# Patient Record
Sex: MALE | Race: BLACK OR AFRICAN AMERICAN
[De-identification: names, ages, dates, MRNs, and addresses within clinical notes are randomized per-mention and may not be internally consistent; named-entity substitution may affect disease eponyms.]

---

## 2021-10-03 ENCOUNTER — HOSPITAL ENCOUNTER (EMERGENCY)
Dept: HOSPITAL 74 - JER | Age: 56
Discharge: LEFT BEFORE DISCHARGE | End: 2021-10-03
Payer: COMMERCIAL

## 2021-10-03 VITALS — SYSTOLIC BLOOD PRESSURE: 205 MMHG | HEART RATE: 62 BPM | DIASTOLIC BLOOD PRESSURE: 116 MMHG

## 2021-10-03 VITALS — BODY MASS INDEX: 26.4 KG/M2

## 2021-10-03 VITALS — TEMPERATURE: 97.7 F

## 2021-10-03 DIAGNOSIS — R07.9: Primary | ICD-10-CM

## 2021-10-03 LAB
ALBUMIN SERPL-MCNC: 2 G/DL (ref 3.4–5)
ALP SERPL-CCNC: 266 U/L (ref 45–117)
ALT SERPL-CCNC: 92 U/L (ref 13–61)
ANION GAP SERPL CALC-SCNC: 8 MMOL/L (ref 8–16)
ANISOCYTOSIS BLD QL: 0
APPEARANCE UR: CLEAR
APTT BLD: 28.7 SECONDS (ref 25.2–36.5)
AST SERPL-CCNC: 70 U/L (ref 15–37)
BACTERIA # UR AUTO: 11 /UL (ref 0–1359)
BASO STIPL BLD QL SMEAR: 0
BILIRUB SERPL-MCNC: 0.3 MG/DL (ref 0.2–1)
BILIRUB UR STRIP.AUTO-MCNC: NEGATIVE MG/DL
BNP SERPL-MCNC: 8180.5 PG/ML (ref 5–125)
BUN SERPL-MCNC: 71.7 MG/DL (ref 7–18)
CALCIUM SERPL-MCNC: 7.4 MG/DL (ref 8.5–10.1)
CASTS URNS QL MICRO: 1 /UL (ref 0–3.1)
CHLORIDE SERPL-SCNC: 111 MMOL/L (ref 98–107)
CO2 SERPL-SCNC: 23 MMOL/L (ref 21–32)
COLOR UR: YELLOW
CREAT SERPL-MCNC: 4.7 MG/DL (ref 0.55–1.3)
DACRYOCYTES BLD QL SMEAR: 0
DEPRECATED RDW RBC AUTO: 14.1 % (ref 11.9–15.9)
DOHLE BOD BLD QL SMEAR: 0
EPITH CASTS URNS QL MICRO: 8 /UL (ref 0–25.1)
GLUCOSE SERPL-MCNC: 182 MG/DL (ref 74–106)
HCT VFR BLD CALC: 36.6 % (ref 35.4–49)
HELMET CELLS BLD QL SMEAR: 0
HGB BLD-MCNC: 12.5 GM/DL (ref 11.7–16.9)
HOWELL-JOLLY BOD BLD QL SMEAR: 0
INR BLD: 0.9 (ref 0.83–1.09)
KETONES UR QL STRIP: NEGATIVE
LEUKOCYTE ESTERASE UR QL STRIP.AUTO: NEGATIVE
LIPASE SERPL-CCNC: 389 U/L (ref 73–393)
MACROCYTES BLD QL: 0
MAGNESIUM SERPL-MCNC: 2.2 MG/DL (ref 1.8–2.4)
MCH RBC QN AUTO: 29.8 PG (ref 25.7–33.7)
MCHC RBC AUTO-ENTMCNC: 34.2 G/DL (ref 32–35.9)
MCV RBC: 87.1 FL (ref 80–96)
NITRITE UR QL STRIP: NEGATIVE
OVALOCYTES BLD QL SMEAR: 0
PH UR: 5.5 [PH] (ref 5–8)
PLATELET # BLD AUTO: 123 10^3/UL (ref 134–434)
PLATELET BLD QL SMEAR: (no result)
PMV BLD: 10.3 FL (ref 7.5–11.1)
PROT SERPL-MCNC: 6.6 G/DL (ref 6.4–8.2)
PROT UR QL STRIP: (no result)
PROT UR QL STRIP: (no result)
PT PNL PPP: 11 SEC (ref 9.7–13)
RBC # BLD AUTO: 4.2 M/MM3 (ref 4–5.6)
RBC # BLD AUTO: 44 /UL (ref 0–23.9)
ROULEAUX BLD QL SMEAR: 0
SICKLE CELLS BLD QL SMEAR: 0
SODIUM SERPL-SCNC: 142 MMOL/L (ref 136–145)
SP GR UR: 1.02 (ref 1.01–1.03)
TARGETS BLD QL SMEAR: 0
TOXIC GRANULES BLD QL SMEAR: 0
UROBILINOGEN UR STRIP-MCNC: 1 MG/DL (ref 0.2–1)
WBC # BLD AUTO: 6.1 K/MM3 (ref 4–10)
WBC # UR AUTO: 11 /UL (ref 0–25.8)

## 2021-10-03 PROCEDURE — U0005 INFEC AGEN DETEC AMPLI PROBE: HCPCS

## 2021-10-03 PROCEDURE — C9803 HOPD COVID-19 SPEC COLLECT: HCPCS

## 2021-10-03 PROCEDURE — U0003 INFECTIOUS AGENT DETECTION BY NUCLEIC ACID (DNA OR RNA); SEVERE ACUTE RESPIRATORY SYNDROME CORONAVIRUS 2 (SARS-COV-2) (CORONAVIRUS DISEASE [COVID-19]), AMPLIFIED PROBE TECHNIQUE, MAKING USE OF HIGH THROUGHPUT TECHNOLOGIES AS DESCRIBED BY CMS-2020-01-R: HCPCS

## 2022-01-13 PROBLEM — Z00.00 ENCOUNTER FOR PREVENTIVE HEALTH EXAMINATION: Status: ACTIVE | Noted: 2022-01-13

## 2022-02-11 ENCOUNTER — APPOINTMENT (OUTPATIENT)
Dept: HEART AND VASCULAR | Facility: CLINIC | Age: 57
End: 2022-02-11
Payer: COMMERCIAL

## 2022-02-11 ENCOUNTER — NON-APPOINTMENT (OUTPATIENT)
Age: 57
End: 2022-02-11

## 2022-02-11 VITALS — SYSTOLIC BLOOD PRESSURE: 190 MMHG | DIASTOLIC BLOOD PRESSURE: 100 MMHG

## 2022-02-11 VITALS
HEIGHT: 71 IN | BODY MASS INDEX: 28 KG/M2 | OXYGEN SATURATION: 98 % | HEART RATE: 68 BPM | TEMPERATURE: 97.9 F | WEIGHT: 200 LBS

## 2022-02-11 VITALS — SYSTOLIC BLOOD PRESSURE: 176 MMHG | DIASTOLIC BLOOD PRESSURE: 107 MMHG

## 2022-02-11 DIAGNOSIS — Z83.3 FAMILY HISTORY OF DIABETES MELLITUS: ICD-10-CM

## 2022-02-11 DIAGNOSIS — Z87.891 PERSONAL HISTORY OF NICOTINE DEPENDENCE: ICD-10-CM

## 2022-02-11 DIAGNOSIS — N52.2 DRUG-INDUCED ERECTILE DYSFUNCTION: ICD-10-CM

## 2022-02-11 DIAGNOSIS — N18.9 CHRONIC KIDNEY DISEASE, UNSPECIFIED: ICD-10-CM

## 2022-02-11 DIAGNOSIS — Z82.49 FAMILY HISTORY OF ISCHEMIC HEART DISEASE AND OTHER DISEASES OF THE CIRCULATORY SYSTEM: ICD-10-CM

## 2022-02-11 DIAGNOSIS — R35.1 BENIGN PROSTATIC HYPERPLASIA WITH LOWER URINARY TRACT SYMPMS: ICD-10-CM

## 2022-02-11 DIAGNOSIS — N40.1 BENIGN PROSTATIC HYPERPLASIA WITH LOWER URINARY TRACT SYMPMS: ICD-10-CM

## 2022-02-11 PROCEDURE — 99204 OFFICE O/P NEW MOD 45 MIN: CPT

## 2022-02-11 PROCEDURE — 93000 ELECTROCARDIOGRAM COMPLETE: CPT

## 2022-02-11 NOTE — ASSESSMENT
[FreeTextEntry1] : HTN- will add Amlodipine 5 mg, pt reports getting bad edema while on 10 mg. Another option is to give an alpha blocker at bedtime simce he has BPH with nocturia x 3.\par \par CKD- will discuss with Dr Sanchez\par \par STTw abnormalities- will echo, stress testing deferred until BP is controlled\par \par ED- Pt told he will need a stress test prior to taking a Viagra type drug.

## 2022-02-11 NOTE — REASON FOR VISIT
[FreeTextEntry1] : 55 y/o AAM with HTN, CKD, here with markedly elevated BP. Pt had a colonoscopy several days ago and was told it was very high there as well.\par \par \par EKG: NSR with ST-Twave abnormalities. 2/11/22

## 2022-03-18 ENCOUNTER — APPOINTMENT (OUTPATIENT)
Dept: HEART AND VASCULAR | Facility: CLINIC | Age: 57
End: 2022-03-18
Payer: COMMERCIAL

## 2022-03-18 ENCOUNTER — APPOINTMENT (OUTPATIENT)
Dept: HEART AND VASCULAR | Facility: CLINIC | Age: 57
End: 2022-03-18

## 2022-03-18 VITALS
SYSTOLIC BLOOD PRESSURE: 191 MMHG | WEIGHT: 192 LBS | DIASTOLIC BLOOD PRESSURE: 124 MMHG | TEMPERATURE: 97.7 F | HEART RATE: 74 BPM | OXYGEN SATURATION: 99 % | BODY MASS INDEX: 26.88 KG/M2 | HEIGHT: 71 IN

## 2022-03-18 PROCEDURE — 93306 TTE W/DOPPLER COMPLETE: CPT

## 2022-03-18 PROCEDURE — 99213 OFFICE O/P EST LOW 20 MIN: CPT

## 2022-03-18 RX ORDER — AMLODIPINE BESYLATE 5 MG/1
5 TABLET ORAL
Qty: 90 | Refills: 3 | Status: DISCONTINUED | COMMUNITY
Start: 2022-02-11 | End: 2022-03-18

## 2022-03-18 RX ORDER — VALSARTAN 160 MG/1
160 TABLET, COATED ORAL DAILY
Refills: 0 | Status: DISCONTINUED | COMMUNITY
End: 2022-03-18

## 2022-03-18 NOTE — ASSESSMENT
[FreeTextEntry1] : HTN- will add Amlodipine 5 mg, pt reports getting bad edema while on 10 mg. Another option is to give an alpha blocker at bedtime simce he has BPH with nocturia x 3.  Pt got edema and so he stopped all pills!!  Warned not to do that.  Echo reveals LVH and a dilated Ao root of 43 mm.  Pt told to resume Coreg 25 BID and I am starting Losartan-HCT 50-12.5.  Pt must return in 30 days and he has been told to call if stopping any meds.  His wife is in attendance today.\par \par CKD- will discuss with Dr Sanchez\par \par STTw abnormalities- will echo, stress testing deferred until BP is controlled\par \par ED- Pt told he will need a stress test prior to taking a Viagra type drug.

## 2022-03-18 NOTE — REASON FOR VISIT
[FreeTextEntry1] : 58 y/o AAM with HTN, CKD, here with markedly elevated BP. Pt had a colonoscopy several days ago and was told it was very high there as well.\par \par 3/18/22 Here for f/u, he stopped ALL pills, blaming all of them for his edema.  BP way up.  Echo today.  Pt told he is not permitted to stop a pill without speaking to me or Dr Sanchez.\par \par \par EKG: NSR with ST-Twave abnormalities. 2/11/22

## 2022-03-22 ENCOUNTER — RX CHANGE (OUTPATIENT)
Age: 57
End: 2022-03-22

## 2022-03-24 ENCOUNTER — RX CHANGE (OUTPATIENT)
Age: 57
End: 2022-03-24

## 2022-03-24 RX ORDER — HYDROCHLOROTHIAZIDE 12.5 MG/1
12.5 TABLET ORAL
Qty: 90 | Refills: 3 | Status: ACTIVE | COMMUNITY
Start: 2022-03-24 | End: 1900-01-01

## 2022-03-24 RX ORDER — LOSARTAN POTASSIUM 50 MG/1
50 TABLET, FILM COATED ORAL
Qty: 90 | Refills: 3 | Status: ACTIVE | COMMUNITY
Start: 2022-03-24 | End: 1900-01-01

## 2022-04-22 ENCOUNTER — APPOINTMENT (OUTPATIENT)
Dept: HEART AND VASCULAR | Facility: CLINIC | Age: 57
End: 2022-04-22

## 2022-09-05 ENCOUNTER — INPATIENT (INPATIENT)
Facility: HOSPITAL | Age: 57
LOS: 4 days | Discharge: HOME | DRG: 673 | End: 2022-09-10
Attending: HOSPITALIST
Payer: COMMERCIAL

## 2022-09-05 VITALS
WEIGHT: 205.03 LBS | RESPIRATION RATE: 20 BRPM | OXYGEN SATURATION: 97 % | SYSTOLIC BLOOD PRESSURE: 171 MMHG | DIASTOLIC BLOOD PRESSURE: 106 MMHG | TEMPERATURE: 98 F | HEIGHT: 72 IN | HEART RATE: 80 BPM
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
## 2022-09-05 DIAGNOSIS — E11.9 TYPE 2 DIABETES MELLITUS WITHOUT COMPLICATIONS: ICD-10-CM

## 2022-09-05 DIAGNOSIS — D64.9 ANEMIA, UNSPECIFIED: ICD-10-CM

## 2022-09-05 DIAGNOSIS — B19.20 UNSPECIFIED VIRAL HEPATITIS C WITHOUT HEPATIC COMA: ICD-10-CM

## 2022-09-05 DIAGNOSIS — Z29.9 ENCOUNTER FOR PROPHYLACTIC MEASURES, UNSPECIFIED: ICD-10-CM

## 2022-09-05 DIAGNOSIS — N18.9 CHRONIC KIDNEY DISEASE, UNSPECIFIED: ICD-10-CM

## 2022-09-05 DIAGNOSIS — E87.2 ACIDOSIS: ICD-10-CM

## 2022-09-05 DIAGNOSIS — I16.0 HYPERTENSIVE URGENCY: ICD-10-CM

## 2022-09-05 DIAGNOSIS — R77.8 OTHER SPECIFIED ABNORMALITIES OF PLASMA PROTEINS: ICD-10-CM

## 2022-09-05 LAB
ALBUMIN SERPL ELPH-MCNC: 3.2 G/DL — LOW (ref 3.3–5)
ALP SERPL-CCNC: 345 U/L — HIGH (ref 40–120)
ALT FLD-CCNC: 44 U/L — SIGNIFICANT CHANGE UP (ref 10–45)
AMPHET UR-MCNC: NEGATIVE — SIGNIFICANT CHANGE UP
ANION GAP SERPL CALC-SCNC: 20 MMOL/L — HIGH (ref 5–17)
APPEARANCE UR: ABNORMAL
APPEARANCE UR: CLEAR — SIGNIFICANT CHANGE UP
AST SERPL-CCNC: 46 U/L — HIGH (ref 10–40)
BACTERIA # UR AUTO: PRESENT /HPF
BACTERIA # UR AUTO: PRESENT /HPF
BARBITURATES UR SCN-MCNC: NEGATIVE — SIGNIFICANT CHANGE UP
BASE EXCESS BLDV CALC-SCNC: -9.9 MMOL/L — LOW (ref -2–3)
BASOPHILS # BLD AUTO: 0.04 K/UL — SIGNIFICANT CHANGE UP (ref 0–0.2)
BASOPHILS NFR BLD AUTO: 0.6 % — SIGNIFICANT CHANGE UP (ref 0–2)
BENZODIAZ UR-MCNC: NEGATIVE — SIGNIFICANT CHANGE UP
BILIRUB SERPL-MCNC: 0.6 MG/DL — SIGNIFICANT CHANGE UP (ref 0.2–1.2)
BILIRUB UR-MCNC: NEGATIVE — SIGNIFICANT CHANGE UP
BILIRUB UR-MCNC: NEGATIVE — SIGNIFICANT CHANGE UP
BLD GP AB SCN SERPL QL: NEGATIVE — SIGNIFICANT CHANGE UP
BUN SERPL-MCNC: 97 MG/DL — HIGH (ref 7–23)
CA-I SERPL-SCNC: 0.9 MMOL/L — LOW (ref 1.15–1.33)
CALCIUM SERPL-MCNC: 6.8 MG/DL — LOW (ref 8.4–10.5)
CHLORIDE SERPL-SCNC: 107 MMOL/L — SIGNIFICANT CHANGE UP (ref 96–108)
CHLORIDE UR-SCNC: 62 MMOL/L — SIGNIFICANT CHANGE UP
CK MB CFR SERPL CALC: 4.3 NG/ML — SIGNIFICANT CHANGE UP (ref 0–6.7)
CK SERPL-CCNC: 449 U/L — HIGH (ref 30–200)
CO2 BLDV-SCNC: 17.9 MMOL/L — LOW (ref 22–26)
CO2 SERPL-SCNC: 17 MMOL/L — LOW (ref 22–31)
COCAINE METAB.OTHER UR-MCNC: NEGATIVE — SIGNIFICANT CHANGE UP
COLOR SPEC: YELLOW — SIGNIFICANT CHANGE UP
COLOR SPEC: YELLOW — SIGNIFICANT CHANGE UP
COMMENT - URINE: SIGNIFICANT CHANGE UP
CREAT ?TM UR-MCNC: 45 MG/DL — SIGNIFICANT CHANGE UP
CREAT ?TM UR-MCNC: 79 MG/DL — SIGNIFICANT CHANGE UP
CREAT SERPL-MCNC: 13.27 MG/DL — HIGH (ref 0.5–1.3)
DIFF PNL FLD: ABNORMAL
DIFF PNL FLD: ABNORMAL
EGFR: 4 ML/MIN/1.73M2 — LOW
EOSINOPHIL # BLD AUTO: 0.24 K/UL — SIGNIFICANT CHANGE UP (ref 0–0.5)
EOSINOPHIL NFR BLD AUTO: 3.5 % — SIGNIFICANT CHANGE UP (ref 0–6)
EPI CELLS # UR: SIGNIFICANT CHANGE UP /HPF (ref 0–5)
EPI CELLS # UR: SIGNIFICANT CHANGE UP /HPF (ref 0–5)
GAS PNL BLDV: 132 MMOL/L — LOW (ref 136–145)
GAS PNL BLDV: SIGNIFICANT CHANGE UP
GLUCOSE BLDC GLUCOMTR-MCNC: 168 MG/DL — HIGH (ref 70–99)
GLUCOSE SERPL-MCNC: 103 MG/DL — HIGH (ref 70–99)
GLUCOSE UR QL: NEGATIVE — SIGNIFICANT CHANGE UP
GLUCOSE UR QL: NEGATIVE — SIGNIFICANT CHANGE UP
GRAN CASTS # UR COMP ASSIST: ABNORMAL /LPF
HCO3 BLDV-SCNC: 17 MMOL/L — LOW (ref 22–29)
HCT VFR BLD CALC: 29.4 % — LOW (ref 39–50)
HGB BLD-MCNC: 9.8 G/DL — LOW (ref 13–17)
IMM GRANULOCYTES NFR BLD AUTO: 1.3 % — SIGNIFICANT CHANGE UP (ref 0–1.5)
KETONES UR-MCNC: NEGATIVE — SIGNIFICANT CHANGE UP
KETONES UR-MCNC: NEGATIVE — SIGNIFICANT CHANGE UP
LEUKOCYTE ESTERASE UR-ACNC: NEGATIVE — SIGNIFICANT CHANGE UP
LEUKOCYTE ESTERASE UR-ACNC: NEGATIVE — SIGNIFICANT CHANGE UP
LIDOCAIN IGE QN: 66 U/L — HIGH (ref 7–60)
LYMPHOCYTES # BLD AUTO: 0.96 K/UL — LOW (ref 1–3.3)
LYMPHOCYTES # BLD AUTO: 13.8 % — SIGNIFICANT CHANGE UP (ref 13–44)
MAGNESIUM SERPL-MCNC: 2.4 MG/DL — SIGNIFICANT CHANGE UP (ref 1.6–2.6)
MCHC RBC-ENTMCNC: 28.2 PG — SIGNIFICANT CHANGE UP (ref 27–34)
MCHC RBC-ENTMCNC: 33.3 GM/DL — SIGNIFICANT CHANGE UP (ref 32–36)
MCV RBC AUTO: 84.5 FL — SIGNIFICANT CHANGE UP (ref 80–100)
METHADONE UR-MCNC: NEGATIVE — SIGNIFICANT CHANGE UP
MONOCYTES # BLD AUTO: 0.77 K/UL — SIGNIFICANT CHANGE UP (ref 0–0.9)
MONOCYTES NFR BLD AUTO: 11.1 % — SIGNIFICANT CHANGE UP (ref 2–14)
NEUTROPHILS # BLD AUTO: 4.85 K/UL — SIGNIFICANT CHANGE UP (ref 1.8–7.4)
NEUTROPHILS NFR BLD AUTO: 69.7 % — SIGNIFICANT CHANGE UP (ref 43–77)
NITRITE UR-MCNC: NEGATIVE — SIGNIFICANT CHANGE UP
NITRITE UR-MCNC: NEGATIVE — SIGNIFICANT CHANGE UP
NRBC # BLD: 0 /100 WBCS — SIGNIFICANT CHANGE UP (ref 0–0)
NT-PROBNP SERPL-SCNC: HIGH PG/ML (ref 0–300)
OPIATES UR-MCNC: NEGATIVE — SIGNIFICANT CHANGE UP
OSMOLALITY UR: 335 MOSM/KG — SIGNIFICANT CHANGE UP (ref 300–900)
OSMOLALITY UR: 352 MOSM/KG — SIGNIFICANT CHANGE UP (ref 300–900)
PCO2 BLDV: 38 MMHG — LOW (ref 42–55)
PCP SPEC-MCNC: SIGNIFICANT CHANGE UP
PCP UR-MCNC: NEGATIVE — SIGNIFICANT CHANGE UP
PH BLDV: 7.25 — LOW (ref 7.32–7.43)
PH UR: 6 — SIGNIFICANT CHANGE UP (ref 5–8)
PH UR: 6 — SIGNIFICANT CHANGE UP (ref 5–8)
PHOSPHATE SERPL-MCNC: 7.6 MG/DL — HIGH (ref 2.5–4.5)
PLATELET # BLD AUTO: 127 K/UL — LOW (ref 150–400)
PO2 BLDV: <33 MMHG — SIGNIFICANT CHANGE UP (ref 25–45)
POTASSIUM BLDV-SCNC: 5.2 MMOL/L — HIGH (ref 3.5–5.1)
POTASSIUM SERPL-MCNC: 4.5 MMOL/L — SIGNIFICANT CHANGE UP (ref 3.5–5.3)
POTASSIUM SERPL-SCNC: 4.5 MMOL/L — SIGNIFICANT CHANGE UP (ref 3.5–5.3)
POTASSIUM UR-SCNC: 24 MMOL/L — SIGNIFICANT CHANGE UP
PROT SERPL-MCNC: 7.1 G/DL — SIGNIFICANT CHANGE UP (ref 6–8.3)
PROT UR-MCNC: 100 MG/DL
PROT UR-MCNC: 100 MG/DL
RBC # BLD: 3.48 M/UL — LOW (ref 4.2–5.8)
RBC # FLD: 13 % — SIGNIFICANT CHANGE UP (ref 10.3–14.5)
RBC CASTS # UR COMP ASSIST: < 5 /HPF — SIGNIFICANT CHANGE UP
RBC CASTS # UR COMP ASSIST: ABNORMAL /HPF
RH IG SCN BLD-IMP: POSITIVE — SIGNIFICANT CHANGE UP
SAO2 % BLDV: 50.4 % — LOW (ref 67–88)
SARS-COV-2 RNA SPEC QL NAA+PROBE: NEGATIVE — SIGNIFICANT CHANGE UP
SODIUM SERPL-SCNC: 144 MMOL/L — SIGNIFICANT CHANGE UP (ref 135–145)
SODIUM UR-SCNC: 111 MMOL/L — SIGNIFICANT CHANGE UP
SODIUM UR-SCNC: 83 MMOL/L — SIGNIFICANT CHANGE UP
SP GR SPEC: 1.02 — SIGNIFICANT CHANGE UP (ref 1–1.03)
SP GR SPEC: 1.02 — SIGNIFICANT CHANGE UP (ref 1–1.03)
THC UR QL: NEGATIVE — SIGNIFICANT CHANGE UP
TROPONIN T SERPL-MCNC: 0.29 NG/ML — CRITICAL HIGH (ref 0–0.01)
UROBILINOGEN FLD QL: 0.2 E.U./DL — SIGNIFICANT CHANGE UP
UROBILINOGEN FLD QL: 0.2 E.U./DL — SIGNIFICANT CHANGE UP
UUN UR-MCNC: 217 MG/DL — SIGNIFICANT CHANGE UP
WBC # BLD: 6.95 K/UL — SIGNIFICANT CHANGE UP (ref 3.8–10.5)
WBC # FLD AUTO: 6.95 K/UL — SIGNIFICANT CHANGE UP (ref 3.8–10.5)
WBC UR QL: < 5 /HPF — SIGNIFICANT CHANGE UP
WBC UR QL: < 5 /HPF — SIGNIFICANT CHANGE UP

## 2022-09-05 PROCEDURE — 71046 X-RAY EXAM CHEST 2 VIEWS: CPT | Mod: 26,59

## 2022-09-05 PROCEDURE — 99291 CRITICAL CARE FIRST HOUR: CPT | Mod: 25

## 2022-09-05 PROCEDURE — 71046 X-RAY EXAM CHEST 2 VIEWS: CPT | Mod: 26

## 2022-09-05 PROCEDURE — 93010 ELECTROCARDIOGRAM REPORT: CPT

## 2022-09-05 RX ORDER — FUROSEMIDE 40 MG
40 TABLET ORAL ONCE
Refills: 0 | Status: COMPLETED | OUTPATIENT
Start: 2022-09-05 | End: 2022-09-05

## 2022-09-05 RX ORDER — INFLUENZA VIRUS VACCINE 15; 15; 15; 15 UG/.5ML; UG/.5ML; UG/.5ML; UG/.5ML
0.5 SUSPENSION INTRAMUSCULAR ONCE
Refills: 0 | Status: DISCONTINUED | OUTPATIENT
Start: 2022-09-05 | End: 2022-09-10

## 2022-09-05 RX ORDER — CARVEDILOL PHOSPHATE 80 MG/1
12.5 CAPSULE, EXTENDED RELEASE ORAL EVERY 12 HOURS
Refills: 0 | Status: DISCONTINUED | OUTPATIENT
Start: 2022-09-05 | End: 2022-09-05

## 2022-09-05 RX ORDER — CALCITRIOL 0.5 UG/1
0.25 CAPSULE ORAL DAILY
Refills: 0 | Status: DISCONTINUED | OUTPATIENT
Start: 2022-09-05 | End: 2022-09-07

## 2022-09-05 RX ORDER — GLUCAGON INJECTION, SOLUTION 0.5 MG/.1ML
1 INJECTION, SOLUTION SUBCUTANEOUS ONCE
Refills: 0 | Status: DISCONTINUED | OUTPATIENT
Start: 2022-09-05 | End: 2022-09-10

## 2022-09-05 RX ORDER — HYDROCHLOROTHIAZIDE 25 MG
12.5 TABLET ORAL DAILY
Refills: 0 | Status: DISCONTINUED | OUTPATIENT
Start: 2022-09-06 | End: 2022-09-06

## 2022-09-05 RX ORDER — ONDANSETRON 8 MG/1
4 TABLET, FILM COATED ORAL EVERY 8 HOURS
Refills: 0 | Status: DISCONTINUED | OUTPATIENT
Start: 2022-09-05 | End: 2022-09-10

## 2022-09-05 RX ORDER — ACETAMINOPHEN 500 MG
1000 TABLET ORAL ONCE
Refills: 0 | Status: COMPLETED | OUTPATIENT
Start: 2022-09-05 | End: 2022-09-05

## 2022-09-05 RX ORDER — DEXTROSE 50 % IN WATER 50 %
12.5 SYRINGE (ML) INTRAVENOUS ONCE
Refills: 0 | Status: DISCONTINUED | OUTPATIENT
Start: 2022-09-05 | End: 2022-09-10

## 2022-09-05 RX ORDER — DEXTROSE 50 % IN WATER 50 %
25 SYRINGE (ML) INTRAVENOUS ONCE
Refills: 0 | Status: DISCONTINUED | OUTPATIENT
Start: 2022-09-05 | End: 2022-09-10

## 2022-09-05 RX ORDER — DEXTROSE 50 % IN WATER 50 %
15 SYRINGE (ML) INTRAVENOUS ONCE
Refills: 0 | Status: DISCONTINUED | OUTPATIENT
Start: 2022-09-05 | End: 2022-09-10

## 2022-09-05 RX ORDER — DIATRIZOATE MEGLUMINE 180 MG/ML
30 INJECTION, SOLUTION INTRAVESICAL ONCE
Refills: 0 | Status: COMPLETED | OUTPATIENT
Start: 2022-09-05 | End: 2022-09-05

## 2022-09-05 RX ORDER — HEPARIN SODIUM 5000 [USP'U]/ML
5000 INJECTION INTRAVENOUS; SUBCUTANEOUS EVERY 8 HOURS
Refills: 0 | Status: DISCONTINUED | OUTPATIENT
Start: 2022-09-05 | End: 2022-09-06

## 2022-09-05 RX ORDER — LOSARTAN POTASSIUM 100 MG/1
100 TABLET, FILM COATED ORAL DAILY
Refills: 0 | Status: DISCONTINUED | OUTPATIENT
Start: 2022-09-05 | End: 2022-09-05

## 2022-09-05 RX ORDER — SODIUM CHLORIDE 9 MG/ML
1000 INJECTION, SOLUTION INTRAVENOUS
Refills: 0 | Status: DISCONTINUED | OUTPATIENT
Start: 2022-09-05 | End: 2022-09-10

## 2022-09-05 RX ORDER — INSULIN LISPRO 100/ML
VIAL (ML) SUBCUTANEOUS
Refills: 0 | Status: DISCONTINUED | OUTPATIENT
Start: 2022-09-05 | End: 2022-09-10

## 2022-09-05 RX ORDER — LOSARTAN POTASSIUM 100 MG/1
1 TABLET, FILM COATED ORAL
Qty: 0 | Refills: 0 | DISCHARGE

## 2022-09-05 RX ORDER — AMLODIPINE BESYLATE 2.5 MG/1
5 TABLET ORAL ONCE
Refills: 0 | Status: COMPLETED | OUTPATIENT
Start: 2022-09-05 | End: 2022-09-05

## 2022-09-05 RX ORDER — ACETAMINOPHEN 500 MG
650 TABLET ORAL EVERY 6 HOURS
Refills: 0 | Status: DISCONTINUED | OUTPATIENT
Start: 2022-09-05 | End: 2022-09-10

## 2022-09-05 RX ORDER — CALCITRIOL 0.5 UG/1
1 CAPSULE ORAL
Qty: 0 | Refills: 0 | DISCHARGE

## 2022-09-05 RX ORDER — LANOLIN ALCOHOL/MO/W.PET/CERES
3 CREAM (GRAM) TOPICAL AT BEDTIME
Refills: 0 | Status: DISCONTINUED | OUTPATIENT
Start: 2022-09-05 | End: 2022-09-10

## 2022-09-05 RX ADMIN — CARVEDILOL PHOSPHATE 12.5 MILLIGRAM(S): 80 CAPSULE, EXTENDED RELEASE ORAL at 17:41

## 2022-09-05 RX ADMIN — Medication 40 MILLIGRAM(S): at 18:40

## 2022-09-05 RX ADMIN — HEPARIN SODIUM 5000 UNIT(S): 5000 INJECTION INTRAVENOUS; SUBCUTANEOUS at 21:52

## 2022-09-05 RX ADMIN — Medication 2: at 21:51

## 2022-09-05 RX ADMIN — LOSARTAN POTASSIUM 100 MILLIGRAM(S): 100 TABLET, FILM COATED ORAL at 17:40

## 2022-09-05 RX ADMIN — AMLODIPINE BESYLATE 5 MILLIGRAM(S): 2.5 TABLET ORAL at 17:40

## 2022-09-05 RX ADMIN — DIATRIZOATE MEGLUMINE 30 MILLILITER(S): 180 INJECTION, SOLUTION INTRAVESICAL at 16:52

## 2022-09-05 RX ADMIN — Medication 1000 MILLIGRAM(S): at 20:40

## 2022-09-05 RX ADMIN — Medication 400 MILLIGRAM(S): at 20:01

## 2022-09-05 NOTE — ED ADULT NURSE NOTE - OBJECTIVE STATEMENT
Pt presents reporting shortness of breath, lower leg edema, round and taught abdomen, itching skin worsening for weeks. Hx of ckd, htn. Pt also reports decreased urine output.

## 2022-09-05 NOTE — H&P ADULT - NSHPPHYSICALEXAM_GEN_ALL_CORE
PHYSICAL EXAM:  General: NAD, AAOx3  HEENT: atraumatic, normocephalic  Pulmonary: clear to auscultation bilaterally; No wheeze  Cardiovascular: Regular rate and rhythm; no murmurs, rubs or gallops. Normal S1S2  Gastrointestinal: Soft, nontender, nondistended; bowel sounds present  Musculoskeletal: 2+ peripheral pulses, no clubbing, cyanosis or edema  Neurology: Pt. alert and oriented, fluent speech, able to move all extremities  Skin: no rashes or lesions PHYSICAL EXAM:  General: NAD, AAOx3  HEENT: atraumatic, normocephalic  Pulmonary: clear to auscultation bilaterally; No wheeze  Cardiovascular: Regular rate and rhythm; no murmurs, rubs or gallops. Normal S1S2  Gastrointestinal: Soft, nontender, +distended; bowel sounds present  Musculoskeletal: 2+ peripheral pulses, no clubbing or cyanosis +Edema b/l  Neurology: Pt. alert and oriented, fluent speech, able to move all extremities  Skin: no rashes or lesions

## 2022-09-05 NOTE — H&P ADULT - PROBLEM SELECTOR PLAN 3
Patient reports fall on right side 2 weeks ago. No ecchymosis on exam.  -PT/OT Pt. with anion gap of 20 on presentation. Likely secondary to elevated BUN in the setting of HUNG on CKD. VBG pH 7.25 which does not indicate need for urgent dialysis.     - will continue to monitor HUNG as above.

## 2022-09-05 NOTE — ED ADULT TRIAGE NOTE - CHIEF COMPLAINT QUOTE
pt presents to ED for c/o sob x 1 week , diffuse abd pain/distention, and bilat lower extremity swelling. PMH HTN, CKD. pt also states he fell several days ago onto R side, c/o R sided rib pain. denies n/v/d, fever, chills, cough, or chest pain

## 2022-09-05 NOTE — ED PROVIDER NOTE - RESPIRATORY, MLM
Breath sounds clear and equal bilaterally. speaking in long full sentences, no resp distress or increased resp effort

## 2022-09-05 NOTE — ED PROVIDER NOTE - ATTENDING CONTRIBUTION TO CARE
The pt is a 58 y/o M, very poor historian, c/o sob x few days to few wks. PMH HTN (non compliant w/meds --losartan, coreg, norvasc), and states being told "kidney function compromised" - has not seen pmd in a yr. States sob, leg swelling (chronic) and abd bloating x wk. Reports a fall 2 wks ago and R side feels sore. Denies fevers, chills, cp, cough, n/v/d, abd pain, dysuria.    pt c/o sob x while, non compliant w/bp meds, states that has been told about kidney problems but never on HD, very poor historian, hypertensive upon arrival - given home meds, ekg non ischemic, initially ordered for ct but upon resolution of labs - acute renal failure w/elevated bnp and trop (likely due to renal function), ct cancelled and lasix ordered, lima placed and lytes sent, micu and renal consulted -- will admit to tele    Agree with above note as documented by MELQUIADES FLOREZ was available as the supervising attending during patient's ER evaluation.    Pt with acute renal failure, swelling, overload - K ok - no acute distress but required immediate consultation with nephrology, MICU - lima placed and will most likely have to have catheter placement for emergent dialysis.

## 2022-09-05 NOTE — H&P ADULT - PROBLEM SELECTOR PLAN 6
Trop 0.29 on admission. Patient denies chest pain and no ischemic changes on EKG.   - trend troponin to peak Hgb 9.8 on admission (unknown baseline Hgb). No melena, hematochezia, or signs of active bleeding. Possibly 2/2 CKD.    -f/u iron studies  -trend CBC  -maintain active T&S  -transfuse if Hgb <7 - pt. with reported history of DM, but not on any medication    - f/u A1C  - start ISS

## 2022-09-05 NOTE — H&P ADULT - ASSESSMENT
56yo M w/ a PMHx of HTN, DM, CKD presented w/ sob, abdominal distension, and LE edema x1wk. Found to have HUNG on CKD and HTN. Admitted for further management of care.

## 2022-09-05 NOTE — H&P ADULT - PROBLEM SELECTOR PLAN 8
Dvt ppx: heparin subQ  PPI ppx: None indicated  Diet: Renal restrictions  Dispo: 7-lach Trop 0.29 on admission. Patient denies chest pain and no ischemic changes on EKG.     - trend troponin to peak

## 2022-09-05 NOTE — PATIENT PROFILE ADULT - FALL HARM RISK - HARM RISK INTERVENTIONS

## 2022-09-05 NOTE — ED PROVIDER NOTE - CLINICAL SUMMARY MEDICAL DECISION MAKING FREE TEXT BOX
pt c/o sob x while, non compliant w/bp meds, states that has been told about kidney problems but never on HD, very poor historian, hypertensive upon arrival - given home meds, ekg non ischemic, initially ordered for ct but upon resolution of labs - acute renal failure w/elevated bnp and trop (likely due to renal function), ct cancelled and lasix ordered, lima placed and lytes sent, micu and renal consulted -- will admit to tele

## 2022-09-05 NOTE — H&P ADULT - NSHPLABSRESULTS_GEN_ALL_CORE
LABS:                         9.8    6.95  )-----------( 127      ( 05 Sep 2022 16:50 )             29.4         144  |  107  |  97<H>  ----------------------------<  103<H>  4.5   |  17<L>  |  13.27<H>    Ca    6.8<L>      05 Sep 2022 16:50    TPro  7.1  /  Alb  3.2<L>  /  TBili  0.6  /  DBili  x   /  AST  46<H>  /  ALT  44  /  AlkPhos  345<H>        Urinalysis Basic - ( 05 Sep 2022 18:40 )    Color: Yellow / Appearance: Clear / S.020 / pH: x  Gluc: x / Ketone: NEGATIVE  / Bili: Negative / Urobili: 0.2 E.U./dL   Blood: x / Protein: 100 mg/dL / Nitrite: NEGATIVE   Leuk Esterase: NEGATIVE / RBC: 5-10 /HPF / WBC < 5 /HPF   Sq Epi: x / Non Sq Epi: 0-5 /HPF / Bacteria: Present /HPF      CARDIAC MARKERS ( 05 Sep 2022 16:50 )  x     / 0.29 ng/mL / x     / x     / x          Serum Pro-Brain Natriuretic Peptide: 73581 pg/mL ( @ 16:50)        RADIOLOGY, EKG & ADDITIONAL TESTS:

## 2022-09-05 NOTE — H&P ADULT - PROBLEM SELECTOR PLAN 4
Patient reports fall on right side 2 weeks ago. No ecchymosis on exam.  -PT/OT Patient reports fall on right side 2 weeks ago. No ecchymosis on exam.    -PT/OT in AM

## 2022-09-05 NOTE — H&P ADULT - PROBLEM SELECTOR PLAN 2
Cr 13.27 (unknown baseline Cr). Follows outpatient w/ nephrologist Dr. Almanza.  - Nephrology following, f/u recs  - q12h BMP  - U.alamz  - Renal US  - avoid nephrotoxic agents  - adjust medication dosages for level of renal function  - strict I/Os and daily weights Cr 13.27 (unknown baseline Cr). Follows outpatient w/ nephrologist Dr. Almanza.    - Nephrology following, f/u recs  - q12h BMP  - U.almaz  - Renal US  - avoid nephrotoxic agents  - adjust medication dosages for level of renal function  - strict I/Os and daily weights

## 2022-09-05 NOTE — H&P ADULT - HISTORY OF PRESENT ILLNESS
56yo M with HTN, DM, CKD presented w/ sob, abdominal distension, and LE edema x1wk. Patient reports he is not always compliant with home medications. Patient also states he fell on to his right side about 2 weeks ago. Patient is not on HD and follows outpatient w/ Dr. Almanza (nephrology). Denies fevers, headaches, change in vision, chest pain, n/v/d.    ED Course:   VITAL SIGNS: Last 24 Hours  T(F): 98.5 (05 Sep 2022 15:49), Max: 98.5 (05 Sep 2022 15:49)  HR: 72 (05 Sep 2022 19:45) (72 - 80)  BP: 130/87 (05 Sep 2022 19:45) (130/87 - 177/116)  RR: 18 (05 Sep 2022 19:45) (18 - 20)  SpO2: 97% (05 Sep 2022 19:45) (94% - 97%)    Labs:  WBC 6.95, Hgb 9.8, Plt 127  Na 144, K 4.5, BUN 97, Cr 13.27, Alk Phos 345, Trop 0.29, BNP 30,151  EKG: NSR, PVC, no ST deviations    Imaging:  CXR: No consolidations or signs of PNX    Interventions:   Amlodipine 5mg, Coreg 12.5, lasix 40mg, losartan 100mg   56 yo M with HTN, DM, CKD presented w/ sob, abdominal distension, and LE edema x1wk. Patient reports he is not always compliant with home medications. Patient also states he fell on to his right side about 2 weeks ago. Patient is not on HD and follows outpatient w/ Dr. Almanza (nephrology). Denies fevers, headaches, change in vision, chest pain, n/v/d.    ED Course:   VITAL SIGNS: Last 24 Hours  T(F): 98.5 (05 Sep 2022 15:49), Max: 98.5 (05 Sep 2022 15:49)  HR: 72 (05 Sep 2022 19:45) (72 - 80)  BP: 130/87 (05 Sep 2022 19:45) (130/87 - 177/116)  RR: 18 (05 Sep 2022 19:45) (18 - 20)  SpO2: 97% (05 Sep 2022 19:45) (94% - 97%)    Labs:  WBC 6.95, Hgb 9.8, Plt 127  Na 144, K 4.5, BUN 97, Cr 13.27, Alk Phos 345, Trop 0.29, BNP 30,151  EKG: NSR, PVC, no ST deviations    Imaging:  CXR: No consolidations or signs of PNX    Interventions:   Amlodipine 5mg, Coreg 12.5, lasix 40mg, losartan 100mg   58 yo M with HTN, DM, CKD, HCV presented w/ sob, abdominal distension, and LE edema. Over the past 2 weeks he has noticed his abdomen expanding and increased LE edema. Over the past week he has become more dyspneic with exertion. Four days prior to admission, patient also fell on his left side as a result of the increased edema. He follows outpatient nephrology with Dr. Almanza as outpatient. He has discussed dialysis with his nephrologist, but was told he did not require dialysis on his last vitis. Patient also reports history of HCV. He reportedly was treated in the past. He follows with GI for HCV, and per patient was still positive for HCV on his last visit 2-3 months prior. Patient reports he is not always compliant with home medications. Denies fevers, headaches, change in vision, chest pain, n/v/d.    ED Course:   VITAL SIGNS: Last 24 Hours  T(F): 98.5 (05 Sep 2022 15:49), Max: 98.5 (05 Sep 2022 15:49)  HR: 72 (05 Sep 2022 19:45) (72 - 80)  BP: 130/87 (05 Sep 2022 19:45) (130/87 - 177/116)  RR: 18 (05 Sep 2022 19:45) (18 - 20)  SpO2: 97% (05 Sep 2022 19:45) (94% - 97%)    Labs:  WBC 6.95, Hgb 9.8, Plt 127  Na 144, K 4.5, BUN 97, Cr 13.27, Alk Phos 345, Trop 0.29, BNP 30,151  EKG: NSR, PVC, no ST deviations    Imaging:  CXR: No consolidations or signs of PNX    Interventions:   Amlodipine 5mg, Coreg 12.5, lasix 40mg, losartan 100mg

## 2022-09-05 NOTE — H&P ADULT - PROBLEM SELECTOR PLAN 9
Dvt ppx: heparin subQ  PPI ppx: None indicated  Diet: Carb consistent with Renal restrictions  Dispo: 7-lach

## 2022-09-05 NOTE — CHART NOTE - NSCHARTNOTEFT_GEN_A_CORE
HPI:    Received call from ED about patient. Patient is a 57 year old with a history of HTN, HLD, DM, CKD, non compliant with medications who presents to the ED with complaint of sob x 1 week , diffuse abd pain/distention, and bilat lower extremity swelling. Nephrology consulted for ARF.     As per chart review patient with elevated BP of 171/106   Labs significant for BUN of 97 and Cr. of 13.27, Bicarb of 17    UA positive for proteins   Albumin 3.2     #ARF   Recommend:  STAT MALIK for strict in and outs   Monitor Urine out put   LASIX 80mg x1 IVP and monitor for urine output   Urine Na+ Urine Cr. Urine Urea, UPCR   Urine toxicology   AVOID RAASi meds for BP control   Renal Sono   BID CMP     I HPI:    Received call from ED about patient. Patient is a 57 year old with a history of HTN, HLD, DM, CKD, non compliant with medications who presents to the ED with complaint of sob x 1 week , diffuse abd pain/distention, and bilat lower extremity swelling. Nephrology consulted for ARF.     As per chart review patient with elevated BP of 171/106   Labs significant for BUN of 97 and Cr. of 13.27, Bicarb of 17    UA positive for proteins   Albumin 3.2     #ARF   Recommend:  STAT MALIK for strict in and outs   Monitor Urine out put   LASIX 80mg x1 IVP and monitor for urine output   Urine Na+ Urine Cr. Urine Urea, UPCR   Urine toxicology   AVOID RAASi meds for BP control   Renal Sono   BID CMP     If patient HPI:    Received call from ED about patient. Patient is a 57 year old with a history of HTN, HLD, DM, CKD, non compliant with medications who presents to the ED with complaint of sob x 1 week , diffuse abd pain/distention, and bilat lower extremity swelling. Nephrology consulted for ARF.     As per chart review patient with elevated BP of 171/106   Labs significant for BUN of 97 and Cr. of 13.27, Bicarb of 17    UA positive for proteins   Albumin 3.2     #ARF   Recommend:  STAT MALIK for strict in and outs   Monitor Urine out put   LASIX 80mg x1 IVP and monitor for urine output   Urine Na+ Urine Cr. Urine Urea, UPCR   Urine toxicology   AVOID RAASi meds for BP control   Renal Sono   BID CMP   ICU Consult     In case, patient continues to have worsening electrolytes and becomes fluid overloaded, will need RRT     Navjot Bonilla M.D.   PGY 4 Nephrology Fellow   [phone]

## 2022-09-05 NOTE — H&P ADULT - PROBLEM SELECTOR PLAN 1
/106 on admission. No AMS or chest pain but patient has elevated Cr (likely 2/2 CKD rather than HTN) and elevated troponin. On home HCTZ 12.5 qd and losartan 50mg qd. Received amlodipine 5mg, coreg 12.5, losartan 100mg, and lasix 40mg IV in ED. Likely 2/2 medication noncompliance vs volume overload i/s/o HUNG on CKD.  - monitor BP after getting multiple agents in ED  - c/w home HCTZ 12.5 (may need to uptitrate)  - hold home losartan 50mg qd i/s/o HUNG /106 on admission. No AMS or chest pain but patient has elevated Cr (likely 2/2 CKD rather than HTN) and elevated troponin. On home HCTZ 12.5 qd and losartan 50mg qd. Received amlodipine 5mg, coreg 12.5, losartan 100mg, and lasix 40mg IV in ED. Likely 2/2 medication noncompliance vs volume overload i/s/o HUNG on CKD.    - monitor BP after getting multiple agents in ED  - c/w home HCTZ 12.5 (may need to uptitrate)  - hold home losartan 50mg qd i/s/o HUNG

## 2022-09-05 NOTE — CONSULT NOTE ADULT - ASSESSMENT
56yo M w/ a PMHx of HTN, DM, CKD presented w/ sob, abdominal distension, and LE edema x1wk. Found to have HUNG on CKD and HTN. Admitted for further management of care.     #HTN Urgency  /106 on admission. No AMS or chest pain but patient has elevated Cr (likely 2/2 CKD rather than HTN) and elevated troponin. On home HCTZ 12.5 qd and losartan 50mg qd. Received amlodipine 5mg, coreg 12.5, losartan 100mg, and lasix 40mg IV in ED. Likely 2/2 medication noncompliance vs volume overload i/s/o HUNG on CKD.  - monitor BP after getting multiple agents in ED  - c/w home HCTZ 12.5 (may need to uptitrate)  - hold home losartan 50mg qd i/s/o HUNG    #HUNG on CKD  Cr 13.27 (unknown baseline Cr). Follows outpatient w/ nephrologist Dr. Almanza.  - Nephrology following, f/u recs  - q12h MEGAN  - Isabella  - Renal US  - avoid nephrotoxic agents  - adjust medication dosages for level of renal function  - strict I/Os and daily weights    #Fall  Patient reports fall on right side 2 weeks ago. No ecchymosis on exam.  -PT/OT    #DM  -f/u A1c  -ISS    #Anemia  Hgb 9.8 on admission (unknown baseline Hgb). No melena, hematochezia, or signs of active bleeding. Possibly 2/2 CKD  -f/u iron studies  -trend CBC  -maintain active T&S  -transfuse if Hgb <7    #Elevated Troponin  Trop 0.29 on admission. Patient denies chest pain and no ischemic changes on EKG.   - trend troponin to peak    Dispo: 7L    All recommendations are considered final after attending attestation.

## 2022-09-05 NOTE — H&P ADULT - PROBLEM SELECTOR PLAN 5
Hgb 9.8 on admission (unknown baseline Hgb). No melena, hematochezia, or signs of active bleeding. Possibly 2/2 CKD  -f/u iron studies  -trend CBC  -maintain active T&S  -transfuse if Hgb <7 Patient reports fall on right side 2 weeks ago. No ecchymosis on exam.    -PT/OT Pt. with history of Hep C. He was reportedly treated as outpatient, but was still positive 2-3 months prior to admission. If pt. has cirrhotic findings on US, may be contributing to abdominal distention.     - f/u HCV labs  - f/u liver US

## 2022-09-05 NOTE — H&P ADULT - PROBLEM SELECTOR PLAN 7
Dvt ppx:  PPI ppx:   Diet  Dispo:   Activity: Trop 0.29 on admission. Patient denies chest pain and no ischemic changes on EKG.     - trend troponin to peak Hgb 9.8 on admission (unknown baseline Hgb). No melena, hematochezia, or signs of active bleeding. Possibly 2/2 CKD.    -f/u iron studies  -trend CBC  -maintain active T&S  -transfuse if Hgb <7

## 2022-09-05 NOTE — CONSULT NOTE ADULT - ATTENDING COMMENTS
56yo M w/ a PMHx of HTN, DM, CKD presented w/ sob, abdominal distension, and LE edema x1wk. Patient reports he is not always compliant with home medications. Patient also states he fell on to his right side about 2 weeks ago. Patient is not on HD and follows outpatient w/ Dr. Almanza (nephrology). Denies fevers, headaches, change in vision, chest pain, n/v/d.    has worsening ARAUJO for the past two weeks or so. worsening whole body swelling predominantly in the BL legs and the abdomen. needs tighter BP control. no indication for emergent HD. not clinically uremic. follow with nephro recs. - Isabella  - Renal US  - avoid nephrotoxic agents  - adjust medication dosages for level of renal function  - strict I/Os and daily weights  fluid restriction.    Fall precautions    serial ekgs.    DVT ppx

## 2022-09-05 NOTE — ED ADULT NURSE NOTE - CHIEF COMPLAINT QUOTE
pt presents to ED for c/o sob x 1 week , diffuse abd pain/distention, and bilat lower extremity swelling. PMH HTN, CKD. pt also states he fell several days ago onto R side, c/o R sided rib pain. denies n/v/d, fever, chills, cough, or chest pain no

## 2022-09-05 NOTE — ED PROVIDER NOTE - MUSCULOSKELETAL, MLM
Spine appears normal, range of motion is not limited, no muscle or joint tenderness; LE 1+ edema b/l

## 2022-09-05 NOTE — ED PROVIDER NOTE - OBJECTIVE STATEMENT
The pt is a 58 y/o M, very poor historian, c/o sob x few days to few wks. PMH HTN (non compliant w/meds --losartan, coreg, norvasc), and states being told "kidney function compromised" - has not seen pmd in a yr. States sob, leg swelling (chronic) and abd bloating x wk. Reports a fall 2 wks ago and R side feels sore. Denies fevers, chills, cp, cough, n/v/d, abd pain, dysuria.

## 2022-09-05 NOTE — PATIENT PROFILE ADULT - FALL HARM RISK - PATIENT NEEDS ASSISTANCE
----- Message from Rosie Agee CNM sent at 3/4/2022 11:18 AM CST -----  Please notify of negative wet mount. I'll send the vaginitis panel for confirmation and this will result early next week. In the meantime, please send a prescription to her pharmacy of choice for lotrisone cream - she can use this BID for symptom relief. Thanks!   Walking

## 2022-09-06 DIAGNOSIS — N18.6 END STAGE RENAL DISEASE: ICD-10-CM

## 2022-09-06 DIAGNOSIS — K74.60 UNSPECIFIED CIRRHOSIS OF LIVER: ICD-10-CM

## 2022-09-06 LAB
A1C WITH ESTIMATED AVERAGE GLUCOSE RESULT: 5.2 % — SIGNIFICANT CHANGE UP (ref 4–5.6)
ALBUMIN SERPL ELPH-MCNC: 2.6 G/DL — LOW (ref 3.3–5)
ALP SERPL-CCNC: 293 U/L — HIGH (ref 40–120)
ALT FLD-CCNC: 38 U/L — SIGNIFICANT CHANGE UP (ref 10–45)
ANION GAP SERPL CALC-SCNC: 18 MMOL/L — HIGH (ref 5–17)
AST SERPL-CCNC: 40 U/L — SIGNIFICANT CHANGE UP (ref 10–40)
BILIRUB SERPL-MCNC: 0.4 MG/DL — SIGNIFICANT CHANGE UP (ref 0.2–1.2)
BUN SERPL-MCNC: 90 MG/DL — HIGH (ref 7–23)
CALCIUM SERPL-MCNC: 6.3 MG/DL — CRITICAL LOW (ref 8.4–10.5)
CHLORIDE SERPL-SCNC: 107 MMOL/L — SIGNIFICANT CHANGE UP (ref 96–108)
CO2 SERPL-SCNC: 15 MMOL/L — LOW (ref 22–31)
CREAT SERPL-MCNC: 13.51 MG/DL — HIGH (ref 0.5–1.3)
EGFR: 4 ML/MIN/1.73M2 — LOW
ESTIMATED AVERAGE GLUCOSE: 103 MG/DL — SIGNIFICANT CHANGE UP (ref 68–114)
FERRITIN SERPL-MCNC: 994 NG/ML — HIGH (ref 30–400)
GGT SERPL-CCNC: 743 U/L — HIGH (ref 9–50)
GLUCOSE BLDC GLUCOMTR-MCNC: 114 MG/DL — HIGH (ref 70–99)
GLUCOSE BLDC GLUCOMTR-MCNC: 138 MG/DL — HIGH (ref 70–99)
GLUCOSE BLDC GLUCOMTR-MCNC: 148 MG/DL — HIGH (ref 70–99)
GLUCOSE BLDC GLUCOMTR-MCNC: 149 MG/DL — HIGH (ref 70–99)
GLUCOSE SERPL-MCNC: 123 MG/DL — HIGH (ref 70–99)
HAV IGM SER-ACNC: SIGNIFICANT CHANGE UP
HBV CORE AB SER-ACNC: REACTIVE
HBV SURFACE AB SER-ACNC: SIGNIFICANT CHANGE UP
HBV SURFACE AG SER-ACNC: SIGNIFICANT CHANGE UP
HCT VFR BLD CALC: 27.9 % — LOW (ref 39–50)
HCV AB S/CO SERPL IA: 41.04 S/CO — HIGH
HCV AB SERPL-IMP: REACTIVE
HCV RNA FLD QL NAA+PROBE: SIGNIFICANT CHANGE UP
HCV RNA SPEC QL PROBE+SIG AMP: DETECTED
HGB BLD-MCNC: 9.2 G/DL — LOW (ref 13–17)
IRON SATN MFR SERPL: 49 % — SIGNIFICANT CHANGE UP (ref 16–55)
IRON SATN MFR SERPL: 81 UG/DL — SIGNIFICANT CHANGE UP (ref 45–165)
MAGNESIUM SERPL-MCNC: 2.5 MG/DL — SIGNIFICANT CHANGE UP (ref 1.6–2.6)
MCHC RBC-ENTMCNC: 28.1 PG — SIGNIFICANT CHANGE UP (ref 27–34)
MCHC RBC-ENTMCNC: 33 GM/DL — SIGNIFICANT CHANGE UP (ref 32–36)
MCV RBC AUTO: 85.3 FL — SIGNIFICANT CHANGE UP (ref 80–100)
NRBC # BLD: 0 /100 WBCS — SIGNIFICANT CHANGE UP (ref 0–0)
PHOSPHATE SERPL-MCNC: 7.9 MG/DL — HIGH (ref 2.5–4.5)
PLATELET # BLD AUTO: 105 K/UL — LOW (ref 150–400)
POTASSIUM SERPL-MCNC: 4.8 MMOL/L — SIGNIFICANT CHANGE UP (ref 3.5–5.3)
POTASSIUM SERPL-SCNC: 4.8 MMOL/L — SIGNIFICANT CHANGE UP (ref 3.5–5.3)
PROT SERPL-MCNC: 5.9 G/DL — LOW (ref 6–8.3)
RBC # BLD: 3.27 M/UL — LOW (ref 4.2–5.8)
RBC # FLD: 12.8 % — SIGNIFICANT CHANGE UP (ref 10.3–14.5)
SODIUM SERPL-SCNC: 140 MMOL/L — SIGNIFICANT CHANGE UP (ref 135–145)
TIBC SERPL-MCNC: 165 UG/DL — LOW (ref 220–430)
TRANSFERRIN SERPL-MCNC: 145 MG/DL — LOW (ref 200–360)
TROPONIN T SERPL-MCNC: 0.24 NG/ML — CRITICAL HIGH (ref 0–0.01)
UIBC SERPL-MCNC: 84 UG/DL — LOW (ref 110–370)
WBC # BLD: 5.43 K/UL — SIGNIFICANT CHANGE UP (ref 3.8–10.5)
WBC # FLD AUTO: 5.43 K/UL — SIGNIFICANT CHANGE UP (ref 3.8–10.5)

## 2022-09-06 PROCEDURE — 99233 SBSQ HOSP IP/OBS HIGH 50: CPT | Mod: GC

## 2022-09-06 PROCEDURE — 76705 ECHO EXAM OF ABDOMEN: CPT | Mod: 26

## 2022-09-06 PROCEDURE — 99223 1ST HOSP IP/OBS HIGH 75: CPT | Mod: GC

## 2022-09-06 PROCEDURE — 74019 RADEX ABDOMEN 2 VIEWS: CPT | Mod: 26

## 2022-09-06 PROCEDURE — 99223 1ST HOSP IP/OBS HIGH 75: CPT

## 2022-09-06 PROCEDURE — 99232 SBSQ HOSP IP/OBS MODERATE 35: CPT

## 2022-09-06 PROCEDURE — 74150 CT ABDOMEN W/O CONTRAST: CPT | Mod: 26

## 2022-09-06 PROCEDURE — 99447 NTRPROF PH1/NTRNET/EHR 11-20: CPT

## 2022-09-06 RX ORDER — FUROSEMIDE 40 MG
40 TABLET ORAL EVERY 12 HOURS
Refills: 0 | Status: DISCONTINUED | OUTPATIENT
Start: 2022-09-06 | End: 2022-09-07

## 2022-09-06 RX ORDER — SODIUM BICARBONATE 1 MEQ/ML
650 SYRINGE (ML) INTRAVENOUS EVERY 12 HOURS
Refills: 0 | Status: DISCONTINUED | OUTPATIENT
Start: 2022-09-06 | End: 2022-09-07

## 2022-09-06 RX ORDER — SENNA PLUS 8.6 MG/1
2 TABLET ORAL AT BEDTIME
Refills: 0 | Status: DISCONTINUED | OUTPATIENT
Start: 2022-09-06 | End: 2022-09-10

## 2022-09-06 RX ORDER — AMLODIPINE BESYLATE 2.5 MG/1
10 TABLET ORAL EVERY 24 HOURS
Refills: 0 | Status: DISCONTINUED | OUTPATIENT
Start: 2022-09-07 | End: 2022-09-07

## 2022-09-06 RX ORDER — POLYETHYLENE GLYCOL 3350 17 G/17G
17 POWDER, FOR SOLUTION ORAL DAILY
Refills: 0 | Status: DISCONTINUED | OUTPATIENT
Start: 2022-09-06 | End: 2022-09-10

## 2022-09-06 RX ORDER — CALCIUM GLUCONATE 100 MG/ML
2 VIAL (ML) INTRAVENOUS ONCE
Refills: 0 | Status: COMPLETED | OUTPATIENT
Start: 2022-09-06 | End: 2022-09-06

## 2022-09-06 RX ORDER — CALCIUM ACETATE 667 MG
667 TABLET ORAL
Refills: 0 | Status: DISCONTINUED | OUTPATIENT
Start: 2022-09-06 | End: 2022-09-07

## 2022-09-06 RX ORDER — HEPARIN SODIUM 5000 [USP'U]/ML
5000 INJECTION INTRAVENOUS; SUBCUTANEOUS ONCE
Refills: 0 | Status: COMPLETED | OUTPATIENT
Start: 2022-09-06 | End: 2022-09-06

## 2022-09-06 RX ADMIN — Medication 650 MILLIGRAM(S): at 23:29

## 2022-09-06 RX ADMIN — HEPARIN SODIUM 5000 UNIT(S): 5000 INJECTION INTRAVENOUS; SUBCUTANEOUS at 14:58

## 2022-09-06 RX ADMIN — HEPARIN SODIUM 5000 UNIT(S): 5000 INJECTION INTRAVENOUS; SUBCUTANEOUS at 22:32

## 2022-09-06 RX ADMIN — Medication 40 MILLIGRAM(S): at 12:32

## 2022-09-06 RX ADMIN — HEPARIN SODIUM 5000 UNIT(S): 5000 INJECTION INTRAVENOUS; SUBCUTANEOUS at 05:18

## 2022-09-06 RX ADMIN — Medication 100 GRAM(S): at 22:32

## 2022-09-06 RX ADMIN — Medication 40 MILLIGRAM(S): at 23:29

## 2022-09-06 RX ADMIN — CALCITRIOL 0.25 MICROGRAM(S): 0.5 CAPSULE ORAL at 11:10

## 2022-09-06 RX ADMIN — Medication 12.5 MILLIGRAM(S): at 05:18

## 2022-09-06 RX ADMIN — SENNA PLUS 2 TABLET(S): 8.6 TABLET ORAL at 22:32

## 2022-09-06 RX ADMIN — Medication 650 MILLIGRAM(S): at 12:32

## 2022-09-06 NOTE — OCCUPATIONAL THERAPY INITIAL EVALUATION ADULT - ADDITIONAL COMMENTS
Pt lives with his wife in an apartment with elevator access ~2-3 steps to enter. Prior to admit, pt independent with all ADLs/IADLs and mobility, did not require any DME or ADs.

## 2022-09-06 NOTE — PHYSICAL THERAPY INITIAL EVALUATION ADULT - PERTINENT HX OF CURRENT PROBLEM, REHAB EVAL
57 year old male presented w/ sob, abdominal distension, and LE edema x1wk. Found to have HUNG on CKD and HTN. Admitted for further management of care.

## 2022-09-06 NOTE — PROGRESS NOTE ADULT - PROBLEM SELECTOR PLAN 1
Cr 13.27 (baseline Cr 10). Follows outpatient w/ nephrologist Dr. Almanza.    - Nephrology following, f/u recs  - q12h BMP  - U.lytes  - Renal US  - avoid nephrotoxic agents  - adjust medication dosages for level of renal function  - strict I/Os and daily weights  -ID planning for PermCath placement tomorrow, plan for dialysis after permcath placement    #fluid overload, abdomen distended, LE edema  -c/w lasix IV 40 BID

## 2022-09-06 NOTE — OCCUPATIONAL THERAPY INITIAL EVALUATION ADULT - DIAGNOSIS, OT EVAL
Pt admitted for HUNG presents at functional baseline, able to perform all bed mobility, transfers, and ADLs independently. No further acute OT needs. Pt will be d/c from OT at this time.

## 2022-09-06 NOTE — PROGRESS NOTE ADULT - SUBJECTIVE AND OBJECTIVE BOX
OVERNIGHT EVENTS:    SUBJECTIVE / INTERVAL HPI: Patient seen and examined at bedside.     VITAL SIGNS:  Vital Signs Last 24 Hrs  T(C): 36.4 (06 Sep 2022 06:11), Max: 36.9 (05 Sep 2022 15:49)  T(F): 97.5 (06 Sep 2022 06:11), Max: 98.5 (05 Sep 2022 15:49)  HR: 68 (06 Sep 2022 04:39) (68 - 80)  BP: 153/84 (06 Sep 2022 04:39) (111/75 - 177/116)  BP(mean): 115 (06 Sep 2022 04:39) (86 - 115)  RR: 17 (06 Sep 2022 04:39) (16 - 20)  SpO2: 99% (06 Sep 2022 04:39) (94% - 99%)    Parameters below as of 06 Sep 2022 04:39  Patient On (Oxygen Delivery Method): room air      I&O's Summary    05 Sep 2022 07:01  -  06 Sep 2022 07:00  --------------------------------------------------------  IN: 100 mL / OUT: 580 mL / NET: -480 mL    06 Sep 2022 07:01  -  06 Sep 2022 09:17  --------------------------------------------------------  IN: 100 mL / OUT: 150 mL / NET: -50 mL        PHYSICAL EXAM:    General: NAD  HEENT: NC/AT; PERRL, anicteric sclera; MMM  Neck: supple  Cardiovascular: +S1/S2; RRR  Respiratory: CTA B/L; no W/R/R  Gastrointestinal: soft, NT/ND; +BSx4  Extremities: WWP; no edema, clubbing or cyanosis  Vascular: 2+ radial, DP/PT pulses B/L  Neurological: AAOx3; no focal deficits    MEDICATIONS:  MEDICATIONS  (STANDING):  calcitriol   Capsule 0.25 MICROGram(s) Oral daily  dextrose 5%. 1000 milliLiter(s) (100 mL/Hr) IV Continuous <Continuous>  dextrose 5%. 1000 milliLiter(s) (50 mL/Hr) IV Continuous <Continuous>  dextrose 50% Injectable 25 Gram(s) IV Push once  dextrose 50% Injectable 12.5 Gram(s) IV Push once  dextrose 50% Injectable 25 Gram(s) IV Push once  glucagon  Injectable 1 milliGRAM(s) IntraMuscular once  heparin   Injectable 5000 Unit(s) SubCutaneous every 8 hours  influenza   Vaccine 0.5 milliLiter(s) IntraMuscular once  insulin lispro (ADMELOG) corrective regimen sliding scale   SubCutaneous Before meals and at bedtime    MEDICATIONS  (PRN):  acetaminophen     Tablet .. 650 milliGRAM(s) Oral every 6 hours PRN Temp greater or equal to 38C (100.4F), Mild Pain (1 - 3)  aluminum hydroxide/magnesium hydroxide/simethicone Suspension 30 milliLiter(s) Oral every 4 hours PRN Dyspepsia  dextrose Oral Gel 15 Gram(s) Oral once PRN Blood Glucose LESS THAN 70 milliGRAM(s)/deciliter  melatonin 3 milliGRAM(s) Oral at bedtime PRN Insomnia  ondansetron Injectable 4 milliGRAM(s) IV Push every 8 hours PRN Nausea and/or Vomiting      ALLERGIES:  Allergies    penicillin (Rash)    Intolerances        LABS:                        9.2    5.43  )-----------( 105      ( 06 Sep 2022 05:30 )             27.9     09-06    140  |  107  |  90<H>  ----------------------------<  123<H>  4.8   |  15<L>  |  13.51<H>    Ca    6.3<LL>      06 Sep 2022 05:30  Phos  7.9       Mg     2.5     -    TPro  5.9<L>  /  Alb  2.6<L>  /  TBili  0.4  /  DBili  x   /  AST  40  /  ALT  38  /  AlkPhos  293<H>        Urinalysis Basic - ( 05 Sep 2022 18:40 )    Color: Yellow / Appearance: Clear / S.020 / pH: x  Gluc: x / Ketone: NEGATIVE  / Bili: Negative / Urobili: 0.2 E.U./dL   Blood: x / Protein: 100 mg/dL / Nitrite: NEGATIVE   Leuk Esterase: NEGATIVE / RBC: 5-10 /HPF / WBC < 5 /HPF   Sq Epi: x / Non Sq Epi: 0-5 /HPF / Bacteria: Present /HPF      CAPILLARY BLOOD GLUCOSE      POCT Blood Glucose.: 138 mg/dL (06 Sep 2022 06:49)      RADIOLOGY & ADDITIONAL TESTS: Reviewed.   Hospital Course:  56yo M w/ a PMHx of HTN, DM, CKD presented w/ sob, abdominal distension, and LE edema x1wk, found to have HTN urgency. /106 on admission. No AMS or chest pain but patient has elevated Cr (likely 2/2 CKD rather than HTN) , metabolic acidosis, elevated troponin. Patient is on HCTZ 12.5 qd and losartan 50mg qd at home, but has history of medication noncompliance. Received amlodipine 5mg, coreg 12.5, losartan 100mg, and lasix 40mg IV in ED. Patient was admitted to [facility] for further monitoring. Nephrology was consulted, plan for IR to place PermCath tomorrow, and then dialysis afterwards. Patient was started on NaHCO3 for metabolic acidosis while he awaits for dialysis. Patient has HepC, distended tympanic abdomen, GI was consulted this morning. Troponins peaked. Switched HCTZ to Amlodipine 10mg tomorrow given HUNG for blood pressure control. Patient was continued on lasix 40 IV BID. Patient currently hemodynamically stable for transfer to [facility].      OVERNIGHT EVENTS: No acute events overnight.    SUBJECTIVE / INTERVAL HPI: Patient seen and examined at bedside. Patient states she feels the same as yesterday. He states he is SOB when he walks, and has difficulty walking up the stairs and around the block. He does endorse a right sides abdominal pain the morning, that gets worse when he pushes against that side. He also states that he feels like he does not urinate as much as he use to the last few weeks despite being on the water pill. Patient denies any n/v, headaches, chest palpitations, constipation, diarrhea. Remaining ROS are negative.     VITAL SIGNS:  Vital Signs Last 24 Hrs  T(C): 36.4 (06 Sep 2022 06:11), Max: 36.9 (05 Sep 2022 15:49)  T(F): 97.5 (06 Sep 2022 06:11), Max: 98.5 (05 Sep 2022 15:49)  HR: 68 (06 Sep 2022 04:39) (68 - 80)  BP: 153/84 (06 Sep 2022 04:39) (111/75 - 177/116)  BP(mean): 115 (06 Sep 2022 04:39) (86 - 115)  RR: 17 (06 Sep 2022 04:39) (16 - 20)  SpO2: 99% (06 Sep 2022 04:39) (94% - 99%)    Parameters below as of 06 Sep 2022 04:39  Patient On (Oxygen Delivery Method): room air      I&O's Summary    05 Sep 2022 07:01  -  06 Sep 2022 07:00  --------------------------------------------------------  IN: 100 mL / OUT: 580 mL / NET: -480 mL    06 Sep 2022 07:01  -  06 Sep 2022 09:17  --------------------------------------------------------  IN: 100 mL / OUT: 150 mL / NET: -50 mL        PHYSICAL EXAM:  General: NAD, AAOx3  HEENT: atraumatic, normocephalic  Pulmonary: clear to auscultation bilaterally; No wheeze  Cardiovascular: Regular rate and rhythm; no murmurs, rubs or gallops. Normal S1S2  Gastrointestinal: firm, nontender, +distended; bowel sounds present, tympanitic abdomen  Musculoskeletal: 2+ peripheral pulses, no clubbing or cyanosis +Edema b/l LE  Neurology: Pt. alert and oriented, fluent speech, able to move all extremities  Skin: no rashes or lesions    MEDICATIONS:  MEDICATIONS  (STANDING):  calcitriol   Capsule 0.25 MICROGram(s) Oral daily  dextrose 5%. 1000 milliLiter(s) (100 mL/Hr) IV Continuous <Continuous>  dextrose 5%. 1000 milliLiter(s) (50 mL/Hr) IV Continuous <Continuous>  dextrose 50% Injectable 25 Gram(s) IV Push once  dextrose 50% Injectable 12.5 Gram(s) IV Push once  dextrose 50% Injectable 25 Gram(s) IV Push once  glucagon  Injectable 1 milliGRAM(s) IntraMuscular once  heparin   Injectable 5000 Unit(s) SubCutaneous every 8 hours  influenza   Vaccine 0.5 milliLiter(s) IntraMuscular once  insulin lispro (ADMELOG) corrective regimen sliding scale   SubCutaneous Before meals and at bedtime    MEDICATIONS  (PRN):  acetaminophen     Tablet .. 650 milliGRAM(s) Oral every 6 hours PRN Temp greater or equal to 38C (100.4F), Mild Pain (1 - 3)  aluminum hydroxide/magnesium hydroxide/simethicone Suspension 30 milliLiter(s) Oral every 4 hours PRN Dyspepsia  dextrose Oral Gel 15 Gram(s) Oral once PRN Blood Glucose LESS THAN 70 milliGRAM(s)/deciliter  melatonin 3 milliGRAM(s) Oral at bedtime PRN Insomnia  ondansetron Injectable 4 milliGRAM(s) IV Push every 8 hours PRN Nausea and/or Vomiting      ALLERGIES:  Allergies    penicillin (Rash)    Intolerances        LABS:                        9.2    5.43  )-----------( 105      ( 06 Sep 2022 05:30 )             27.9         140  |  107  |  90<H>  ----------------------------<  123<H>  4.8   |  15<L>  |  13.51<H>    Ca    6.3<LL>      06 Sep 2022 05:30  Phos  7.9       Mg     2.5         TPro  5.9<L>  /  Alb  2.6<L>  /  TBili  0.4  /  DBili  x   /  AST  40  /  ALT  38  /  AlkPhos  293<H>        Urinalysis Basic - ( 05 Sep 2022 18:40 )    Color: Yellow / Appearance: Clear / S.020 / pH: x  Gluc: x / Ketone: NEGATIVE  / Bili: Negative / Urobili: 0.2 E.U./dL   Blood: x / Protein: 100 mg/dL / Nitrite: NEGATIVE   Leuk Esterase: NEGATIVE / RBC: 5-10 /HPF / WBC < 5 /HPF   Sq Epi: x / Non Sq Epi: 0-5 /HPF / Bacteria: Present /HPF      CAPILLARY BLOOD GLUCOSE      POCT Blood Glucose.: 138 mg/dL (06 Sep 2022 06:49)      RADIOLOGY & ADDITIONAL TESTS: Reviewed.

## 2022-09-06 NOTE — OCCUPATIONAL THERAPY INITIAL EVALUATION ADULT - FINE MOTOR COORDINATION, RIGHT HAND THUMB/FINGER OPPOSITION SKILLS, OT EVAL
"Subjective:      No results for input(s): WBC, RBC, HEMOGLOBIN, HEMATOCRIT, MCV, MCH, RDW, PLATELETCT, MPV, NEUTSPOLYS, LYMPHOCYTES, MONOCYTES, EOSINOPHILS, BASOPHILS, RBCMORPHOLO in the last 72 hours. female POD#1 following status post vaginal hysterectomy, anterior colporrhaphy, posterior colporrhaphy, colposuspension and cystoscopy. for pelvic relaxation. Eating a regular diet without difficulty. Bowel movements are Normal.  Pain is controlled with current analgesics.  Medication(s) being used: prescription NSAID's including norco.       Objective:      /60 mmHg  Ht 1.626 m (5' 4.02\")  Wt 74.39 kg (164 lb)  BMI 28.14 kg/m2  Breastfeeding? Yes  General:  no acute distress, alert, cooperative   Abdomen: soft, bowel sounds active, non-tender   Incision:   healing well, no drainage, no erythema, no hernia, no seroma, no swelling, well approximated, no dehiscence, incision well approximated       Assessment:      Doing well postoperatively.  Operative findings again reviewed. Pathology report discussed.      Plan:      1. Continue any current medications.  2. Wound care discussed.  3. Pt is to increase activities as tolerated.  4. Follow up: 2 weeks for check.  5. Anticipated return to work 4 weeks.   " normal performance

## 2022-09-06 NOTE — CONSULT NOTE ADULT - ASSESSMENT
per Internal Medicine    57 y o M w/ a PMHx of HTN, DM, CKD presented w/ sob, abdominal distension, and LE edema x1wk. Found to have HUNG on CKD and HTN. Admitted for further management of care.     Problem/Plan - 1:  ·  Problem: Acute kidney injury superimposed on CKD.   ·  Plan: Cr 13.27 (baseline Cr 10). Follows outpatient w/ nephrologist Dr. Almanza.    - Nephrology following, f/u recs  - q12h BMP  - U.lytes  - Renal US  - avoid nephrotoxic agents  - adjust medication dosages for level of renal function  - strict I/Os and daily weights  -ID planning for PermCath placement tomorrow, plan for dialysis after permcath placement    #fluid overload, abdomen distended, LE edema  -c/w lasix IV 40 BID.    Problem/Plan - 2:  ·  Problem: Hypertensive urgency.   ·  Plan: /106 on admission. No AMS or chest pain but patient has elevated Cr (likely 2/2 CKD rather than HTN) and elevated troponin. On home HCTZ 12.5 qd and losartan 50mg qd. Received amlodipine 5mg, coreg 12.5, losartan 100mg, and lasix 40mg IV in ED. Likely 2/2 medication noncompliance vs volume overload i/s/o HUNG on CKD.    - monitor BP after getting multiple agents in ED  - c/w Amlodipine 10mg daily   - hold home losartan 50mg qd i/s/o HUNG  -f/u ECHO.    Problem/Plan - 3:  ·  Problem: High anion gap metabolic acidosis.   ·  Plan: Pt. with anion gap of 20 on presentation. Likely secondary to elevated BUN in the setting of HUNG on CKD. VBG pH 7.25 which does not indicate need for urgent dialysis.     - will continue to monitor HUNG as above.  -plan for dialysis tomorrow  -Start NaHCO3- 650 mg BID for met acidosis until patient starts dialysis.    Problem/Plan - 4:  ·  Problem: Fall.   ·  Plan: Patient reports fall on right side 2 weeks ago. No ecchymosis on exam.    -PT/OT in AM.    Problem/Plan - 5:  ·  Problem: Hepatitis C.   ·  Plan: Pt. with history of Hep C. He was reportedly treated as outpatient, but was still positive 2-3 months prior to admission. If pt. has cirrhotic findings on US, may be contributing to abdominal distention.     - f/u HCV labs  - f/u liver US  -f/u GI recs  - f/u CT abdomen/pelvis    #distended abdomen, tympanitic on physical exam  -f/u abdominal x-ray.    Problem/Plan - 6:  ·  Problem: Diabetes mellitus.   ·  Plan: - pt. with reported history of DM, but not on any medication    - f/u A1C  - start ISS.    Problem/Plan - 7:  ·  Problem: Anemia.   ·  Plan: Hgb 9.8 on admission (unknown baseline Hgb). No melena, hematochezia, or signs of active bleeding. Possibly 2/2 CKD.    -f/u iron studies  -trend CBC  -maintain active T&S  -transfuse if Hgb <7.    Problem/Plan - 8:  ·  Problem: Elevated troponin.   ·  Plan: Trop 0.29 on admission. Patient denies chest pain and no ischemic changes on EKG.   -troponin peaked, repeat troponin 0.24  - resolved.    Problem/Plan - 9:  ·  Problem: Prophylactic measure.   ·  Plan: Dvt ppx: heparin subQ  PPI ppx: None indicated  Diet: Carb consistent with Renal restrictions  Dispo: 7-lach.

## 2022-09-06 NOTE — PROGRESS NOTE ADULT - PROBLEM SELECTOR PLAN 3
Pt. with anion gap of 20 on presentation. Likely secondary to elevated BUN in the setting of HUNG on CKD. VBG pH 7.25 which does not indicate need for urgent dialysis.     - will continue to monitor HUNG as above.  -plan for dialysis tomorrow  -Start NaHCO3- 650 mg BID for met acidosis until patient starts dialysis. /106 on admission. No AMS or chest pain but patient has elevated Cr (likely 2/2 CKD rather than HTN) and elevated troponin. On home HCTZ 12.5 qd and losartan 50mg qd. Received amlodipine 5mg, coreg 12.5, losartan 100mg, and lasix 40mg IV in ED. Likely 2/2 medication noncompliance vs volume overload i/s/o HUNG on CKD.    - monitor BP after getting multiple agents in ED  - c/w Amlodipine 10mg daily   - hold home losartan 50mg qd i/s/o HUNG  -f/u ECHO

## 2022-09-06 NOTE — CONSULT NOTE ADULT - ASSESSMENT
56 yo M with HTN, DM, CKD, HCV presented w/ sob, abdominal distension, and LE edema. GI consulted for abd distension.     #Abd distension  - some degree of distension in setting of anasarca due to renal failure; however, also had ileus in setting of illness as evidenced by imaging and corroborated with tympanitic gaseous distension  - encourage ambulation, hydration, and can rx miralax, senna to aid in BM's; he is passing flatus, but has not had a BM   - f/u HCV RNA    Vivienne Hernandez MD  PGY-6, Gastroenterology Fellow  pager: [phone]

## 2022-09-06 NOTE — PROGRESS NOTE ADULT - PROBLEM SELECTOR PLAN 8
Trop 0.29 on admission. Patient denies chest pain and no ischemic changes on EKG. Likely 2/2 HUNG on CKD  -troponin peaked, repeat troponin 0.24  - resolved Hgb 9.8 on admission (unknown baseline Hgb). No melena, hematochezia, or signs of active bleeding. Possibly 2/2 CKD.    -iron panel c/w AOCD  -trend CBC  -maintain active T&S  -transfuse if Hgb <7

## 2022-09-06 NOTE — PROGRESS NOTE ADULT - PROBLEM SELECTOR PLAN 1
Cr 13.27 (baseline Cr 10). Follows outpatient w/ nephrologist Dr. Almanza.    - Nephrology following, f/u recs  - q12h BMP  - U.almaz  - Renal US  - f/u pth, vit d. Hb not at goal, f/u iron panel  - phoslo bid w/ meals  - avoid nephrotoxic agents  - adjust medication dosages for level of renal function  - strict I/Os and daily weights  -ID planning for PermCath placement tomorrow, plan for dialysis after permcath placement    #fluid overload, abdomen distended, LE edema  -c/w lasix IV 40 BID

## 2022-09-06 NOTE — PROGRESS NOTE ADULT - PROBLEM SELECTOR PLAN 9
Dvt ppx: heparin subQ  PPI ppx: None indicated  Diet: Carb consistent with Renal restrictions  Dispo: F Trop 0.29 on admission. Patient denies chest pain and no ischemic changes on EKG. Likely 2/2 HUNG on CKD  -troponin peaked, repeat troponin 0.24  - resolved

## 2022-09-06 NOTE — PROGRESS NOTE ADULT - PROBLEM SELECTOR PLAN 7
Hgb 9.8 on admission (unknown baseline Hgb). No melena, hematochezia, or signs of active bleeding. Possibly 2/2 CKD.    -iron panel c/w AOCD  -trend CBC  -maintain active T&S  -transfuse if Hgb <7 - pt. with reported history of DM, but not on any medication    - f/u A1C  - start ISS

## 2022-09-06 NOTE — PROGRESS NOTE ADULT - SUBJECTIVE AND OBJECTIVE BOX
**************TRANSFER ACCEPTED FROM [facility] TO [facility] 4URIS*********************************      Hospital Course:  58yo M w/ a PMHx of HTN, DM, CKD presented w/ sob, abdominal distension, and LE edema x1wk, found hypertensive 171/106 on admission. Patient noted to be a poor historian. Patient and wife deny AMS or chest pain but patient has elevated Cr , metabolic acidosis, elevated troponin. He has a history of CKD but on last visit to nephrologist told he did not need to be on dialysis. Patient is on HCTZ 12.5 qd and losartan 50mg qd at home, but has history of medication noncompliance (says he occasionally misses doses but when he has leg swelling stops taking the Amlodipine). Received amlodipine 5mg, coreg 12.5, losartan 100mg, and lasix 40mg IV in ED. Patient was admitted to [facility] for further monitoring. Nephrology was consulted, plan for IR to place PermCath tomorrow, and then dialysis afterwards. Patient was started on NaHCO3 for metabolic acidosis while he awaits for dialysis. Patient has HepC (said he received treatment at some point but does not know the details), distended tympanic abdomen, GI was consulted this morning. Troponins peaked. Switched HCTZ to Amlodipine 10mg tomorrow given HUNG for blood pressure control. Patient was continued on lasix 40 IV BID. Patient currently hemodynamically stable for transfer to [facility].      OVERNIGHT EVENTS: No acute events overnight.    SUBJECTIVE / INTERVAL HPI: Patient seen and examined at bedside. Patient states she feels the same as yesterday. He states he is SOB when he walks, and has difficulty walking up the stairs and around the block. He does endorse a right sides abdominal pain the morning, that gets worse when he pushes against that side. He also states that he feels like he does not urinate as much as he use to the last few weeks despite being on the water pill. Patient denies any n/v, headaches, chest palpitations, diarrhea. Remaining ROS are negative.     VITAL SIGNS:  Vital Signs Last 24 Hrs  T(C): 36.4 (06 Sep 2022 06:11), Max: 36.9 (05 Sep 2022 15:49)  T(F): 97.5 (06 Sep 2022 06:11), Max: 98.5 (05 Sep 2022 15:49)  HR: 68 (06 Sep 2022 04:39) (68 - 80)  BP: 153/84 (06 Sep 2022 04:39) (111/75 - 177/116)  BP(mean): 115 (06 Sep 2022 04:39) (86 - 115)  RR: 17 (06 Sep 2022 04:39) (16 - 20)  SpO2: 99% (06 Sep 2022 04:39) (94% - 99%)    Parameters below as of 06 Sep 2022 04:39  Patient On (Oxygen Delivery Method): room air      I&O's Summary    05 Sep 2022 07:01  -  06 Sep 2022 07:00  --------------------------------------------------------  IN: 100 mL / OUT: 580 mL / NET: -480 mL    06 Sep 2022 07:01  -  06 Sep 2022 09:17  --------------------------------------------------------  IN: 100 mL / OUT: 150 mL / NET: -50 mL        PHYSICAL EXAM:  General: NAD, AAOx3  HEENT: atraumatic, normocephalic  Pulmonary: clear to auscultation bilaterally; No wheeze, or ronchi +rales at the bases, L>R  Cardiovascular: Regular rate and rhythm; no murmurs, rubs or gallops. Normal S1S2  Gastrointestinal: firm, nontender, +distended; No fluid wave, bowel sounds present, tympanitic abdomen  Musculoskeletal: 2+ peripheral pulses, no clubbing or cyanosis +Edema b/l LE  Neurology: Pt. alert and oriented x3, fluent speech, able to move all extremities. no asterixis  Skin: no rashes or lesions    MEDICATIONS:  MEDICATIONS  (STANDING):  calcitriol   Capsule 0.25 MICROGram(s) Oral daily  dextrose 5%. 1000 milliLiter(s) (100 mL/Hr) IV Continuous <Continuous>  dextrose 5%. 1000 milliLiter(s) (50 mL/Hr) IV Continuous <Continuous>  dextrose 50% Injectable 25 Gram(s) IV Push once  dextrose 50% Injectable 12.5 Gram(s) IV Push once  dextrose 50% Injectable 25 Gram(s) IV Push once  glucagon  Injectable 1 milliGRAM(s) IntraMuscular once  heparin   Injectable 5000 Unit(s) SubCutaneous every 8 hours  influenza   Vaccine 0.5 milliLiter(s) IntraMuscular once  insulin lispro (ADMELOG) corrective regimen sliding scale   SubCutaneous Before meals and at bedtime    MEDICATIONS  (PRN):  acetaminophen     Tablet .. 650 milliGRAM(s) Oral every 6 hours PRN Temp greater or equal to 38C (100.4F), Mild Pain (1 - 3)  aluminum hydroxide/magnesium hydroxide/simethicone Suspension 30 milliLiter(s) Oral every 4 hours PRN Dyspepsia  dextrose Oral Gel 15 Gram(s) Oral once PRN Blood Glucose LESS THAN 70 milliGRAM(s)/deciliter  melatonin 3 milliGRAM(s) Oral at bedtime PRN Insomnia  ondansetron Injectable 4 milliGRAM(s) IV Push every 8 hours PRN Nausea and/or Vomiting      ALLERGIES:  Allergies    penicillin (Rash)    Intolerances        LABS:                        9.2    5.43  )-----------( 105      ( 06 Sep 2022 05:30 )             27.9     09-    140  |  107  |  90<H>  ----------------------------<  123<H>  4.8   |  15<L>  |  13.51<H>    Ca    6.3<LL>      06 Sep 2022 05:30  Phos  7.9       Mg     2.5         TPro  5.9<L>  /  Alb  2.6<L>  /  TBili  0.4  /  DBili  x   /  AST  40  /  ALT  38  /  AlkPhos  293<H>        Urinalysis Basic - ( 05 Sep 2022 18:40 )    Color: Yellow / Appearance: Clear / S.020 / pH: x  Gluc: x / Ketone: NEGATIVE  / Bili: Negative / Urobili: 0.2 E.U./dL   Blood: x / Protein: 100 mg/dL / Nitrite: NEGATIVE   Leuk Esterase: NEGATIVE / RBC: 5-10 /HPF / WBC < 5 /HPF   Sq Epi: x / Non Sq Epi: 0-5 /HPF / Bacteria: Present /HPF      CAPILLARY BLOOD GLUCOSE      POCT Blood Glucose.: 138 mg/dL (06 Sep 2022 06:49)      RADIOLOGY & ADDITIONAL TESTS: Reviewed.

## 2022-09-06 NOTE — PROGRESS NOTE ADULT - SUBJECTIVE AND OBJECTIVE BOX
INTERVAL HISTORY:  Admitted with HTN and volume overload with RF.    	  MEDICATIONS:  acetaminophen     Tablet .. 650 milliGRAM(s) Oral every 6 hours PRN  melatonin 3 milliGRAM(s) Oral at bedtime PRN  ondansetron Injectable 4 milliGRAM(s) IV Push every 8 hours PRN    aluminum hydroxide/magnesium hydroxide/simethicone Suspension 30 milliLiter(s) Oral every 4 hours PRN    dextrose 50% Injectable 25 Gram(s) IV Push once  dextrose 50% Injectable 12.5 Gram(s) IV Push once  dextrose 50% Injectable 25 Gram(s) IV Push once  dextrose Oral Gel 15 Gram(s) Oral once PRN  glucagon  Injectable 1 milliGRAM(s) IntraMuscular once  insulin lispro (ADMELOG) corrective regimen sliding scale   SubCutaneous Before meals and at bedtime    calcitriol   Capsule 0.25 MICROGram(s) Oral daily  dextrose 5%. 1000 milliLiter(s) IV Continuous <Continuous>  dextrose 5%. 1000 milliLiter(s) IV Continuous <Continuous>  heparin   Injectable 5000 Unit(s) SubCutaneous every 8 hours  influenza   Vaccine 0.5 milliLiter(s) IntraMuscular once        PHYSICAL EXAM:  T(C): 36.4 (09-06-22 @ 06:11), Max: 36.9 (09-05-22 @ 15:49)  HR: 68 (09-06-22 @ 04:39) (68 - 80)  BP: 153/84 (09-06-22 @ 04:39) (111/75 - 177/116)  RR: 17 (09-06-22 @ 04:39) (16 - 20)  SpO2: 99% (09-06-22 @ 04:39) (94% - 99%)  Wt(kg): --  I&O's Summary    05 Sep 2022 07:01  -  06 Sep 2022 07:00  --------------------------------------------------------  IN: 0 mL / OUT: 555 mL / NET: -555 mL      Height (cm): 182.9 (09-05 @ 15:49)  Weight (kg): 93 (09-05 @ 15:49)  BMI (kg/m2): 27.8 (09-05 @ 15:49)  BSA (m2): 2.15 (09-05 @ 15:49)    Appearance: Normal	  Cardiovascular: Normal S1 S2, No JVD, No murmurs, No edema  Respiratory: Lungs clear to auscultation	  Psychiatry: A & O x 3, Mood & affect appropriate  Gastrointestinal:  Soft, Non-tender, + BS	  Skin: No rashes, No ecchymoses, No cyanosis  Neurologic: Non-focal  Extremities:   No clubbing, cyanosis or edema  Vascular: Peripheral pulses palpable 2+ bilaterally    TELEMETRY: 	    ECG:  	  RADIOLOGY:   DIAGNOSTIC TESTING:  [ ] Echocardiogram:  [ ]  Catheterization:  [ ] Stress Test:    OTHER: 	    LABS:	 	    CARDIAC MARKERS:                                  9.2    5.43  )-----------( 105      ( 06 Sep 2022 05:30 )             27.9     09-05    144  |  107  |  97<H>  ----------------------------<  103<H>  4.5   |  17<L>  |  13.27<H>    Ca    6.8<L>      05 Sep 2022 16:50  Phos  7.6     09-05  Mg     2.4     09-05    TPro  7.1  /  Alb  3.2<L>  /  TBili  0.6  /  DBili  x   /  AST  46<H>  /  ALT  44  /  AlkPhos  345<H>  09-05    proBNP: Serum Pro-Brain Natriuretic Peptide: 47961 pg/mL (09-05 @ 16:50)    Lipid Profile:   HgA1c:   TSH:     ASSESSMENT/PLAN:

## 2022-09-06 NOTE — PROGRESS NOTE ADULT - PROBLEM SELECTOR PLAN 5
Pt. with history of Hep C. He was reportedly treated as outpatient, but was still positive 2-3 months prior to admission. If pt. has cirrhotic findings on US, may be contributing to abdominal distention.     - f/u hep panel  - f/u liver US  -f/u GI recs  - f/u CT abdomen/pelvis    #distended abdomen, tympanitic on physical exam  -abd x-ray no sig findings Patient reports fall on right side 2 weeks ago. No ecchymosis on exam.    -PT/OT in AM

## 2022-09-06 NOTE — PROGRESS NOTE ADULT - PROBLEM SELECTOR PLAN 7
Hgb 9.8 on admission (unknown baseline Hgb). No melena, hematochezia, or signs of active bleeding. Possibly 2/2 CKD.    -f/u iron studies  -trend CBC  -maintain active T&S  -transfuse if Hgb <7

## 2022-09-06 NOTE — PROGRESS NOTE ADULT - PROBLEM SELECTOR PLAN 5
Pt. with history of Hep C. He was reportedly treated as outpatient, but was still positive 2-3 months prior to admission. If pt. has cirrhotic findings on US, may be contributing to abdominal distention.     - f/u HCV labs  - f/u liver US  -f/u GI recs  - f/u CT abdomen/pelvis    #distended abdomen, tympanitic on physical exam  -f/u abdominal x-ray

## 2022-09-06 NOTE — CONSULT NOTE ADULT - ATTENDING COMMENTS
advanced CKD that has progressed  uremic sx, dyspnea, fluid overload, ascites likely   met acidosis   discussed with pt who agrees to initiate chronic dialysis  plan HD catheter by IR   cont IV lasix , PO nahco3 meanwhile  phoslo 2 tab  TID and calcitriol with hypocalcemia and hyperphosphatemia advanced CKD that has progressed  uremic sx, dyspnea, fluid overload, ascites likely   met acidosis   discussed with pt who agrees to initiate chronic dialysis  plan HD catheter by IR and HD for clearance and UF   cont IV lasix , PO nahco3 meanwhile  phoslo 2 tab  TID and calcitriol with hypocalcemia and hyperphosphatemia

## 2022-09-06 NOTE — PROGRESS NOTE ADULT - PROBLEM SELECTOR PLAN 4
Patient reports fall on right side 2 weeks ago. No ecchymosis on exam.    -PT/OT in AM Pt. with anion gap of 20 on presentation. Likely secondary to elevated BUN in the setting of HUNG on CKD. VBG pH 7.25 which does not indicate need for urgent dialysis.     - will continue to monitor HUNG as above.  -plan for dialysis tomorrow  -Start NaHCO3- 650 mg BID for met acidosis until patient starts dialysis.

## 2022-09-06 NOTE — PROGRESS NOTE ADULT - PROBLEM SELECTOR PLAN 2
/106 on admission. No AMS or chest pain but patient has elevated Cr (likely 2/2 CKD rather than HTN) and elevated troponin. On home HCTZ 12.5 qd and losartan 50mg qd. Received amlodipine 5mg, coreg 12.5, losartan 100mg, and lasix 40mg IV in ED. Likely 2/2 medication noncompliance vs volume overload i/s/o HUNG on CKD.    - monitor BP after getting multiple agents in ED  - c/w Amlodipine 10mg daily   - hold home losartan 50mg qd i/s/o HUNG  -f/u ECHO Cr 13.27 (baseline Cr 10). Follows outpatient w/ nephrologist Dr. Almanza.    - Nephrology following, f/u recs  - q12h BMP  - U.almaz  - Renal US  - f/u pth, vit d. Hb not at goal, f/u iron panel  - phoslo bid w/ meals  - avoid nephrotoxic agents  - adjust medication dosages for level of renal function  - strict I/Os and daily weights  -ID planning for PermCath placement tomorrow, plan for dialysis after permcath placement    #fluid overload, abdomen distended, LE edema  -c/w lasix IV 40 BID

## 2022-09-06 NOTE — PROGRESS NOTE ADULT - ATTENDING COMMENTS
Patient was seen and examined at bedside on 9/6/2022 at 530 pm. Patient has no acute complaints - feels improved. Denies SOB, CP. ROS is otherwise negative. Vitals, labwork and pertinent imaging reviewed - elevated Cr. Physical exam - NAD, AAO x 4, PERRLA, EOMI, MMM, supple neck, chest - CTA b/l, CV - rrr, s1s2, no m/r/g, abd - soft, NTND, + BS, ext - wwp, psych - normal affect, skin - no rash    Plan  -Check TTE  -For HD tomorrow  -Repeat CMP tonight given electrolyte abnormalities  -Will d/w IR if can also do diagnostic/therapeutic para in AM

## 2022-09-06 NOTE — CONSULT NOTE ADULT - ASSESSMENT
58 yo M with HTN, DM, CKD (baseline Cr. ~10) , HCV, COVID  presented w/ sob, abdominal distension, and LE edema. Over the past 2 weeks he has noticed his abdomen expanding and increased LE edema. Over the past week he has become more dyspneic with exertion.. He follows outpatient nephrology with Dr. Almanza as outpatient. He has discussed dialysis with his nephrologist, but was told he did not require dialysis on his last vitis. Patient also reports history of HCV. He reportedly was treated in the past. He follows with GI for HCV, and per patient was still positive for HCV on his last visit 2-3 months prior. Patient reports he is not always compliant with home medications. Patient was found to have elevated Cr of 13 and oliguric. Nephrology consulted for initiation of HD.       #CKD progressed to ESRD   Baseline Cr ~ 10  Patient has history of active Hep C infection and CVOID PNA both of which could hae worsened and progressively worsen kidney function  Patient with nephrotic syndrome given, hypoalbuminemia, edema and nephrotic range proteinuria possible FSGS   Remains oliguric s/p IV lasix   Urine output around 500cc in 24hr   Mild uremic symptoms    Plan   IR to place PermCath tomorrow, and will initiate dialysis tomorrow 9/7  Continue to with Lasix   Hep panel   Please obtain Hep panel, Cryoglobulins, C3 and C4   Monitor Urine out put   Renal Diet   Renally dose meds   BID BMP   Monitor Potassium and for now will medically manage     #Anion Gap Metabolic Acidosis  2/2 to CKD , Bicarbonate of 15    Plan:  Continue with Sodium Bicarb until initiation of HD     #MBD  Calcium 6.6  Phosphorus 7.9    Plan:  Please obtain PTH, Vitamin D25   Daily Phosphorus   Start patient on Calcium acetate 667 BID with meals   Start patient on Calcitriol .5mcg PO daily   Low phos diet along with renal diet     #Anemia:  Hgb 9.2 not at goal   Please obtain iron panel   Will hold of EPO for now given uncontrolled BP, once controlled will initiate with HD     #HTN:  UF with HD   Continue with home antihypertensives       56 yo M with HTN, DM, CKD (baseline Cr. ~10) , HCV, COVID  presented w/ sob, abdominal distension, and LE edema. Over the past 2 weeks he has noticed his abdomen expanding and increased LE edema. Over the past week he has become more dyspneic with exertion.. He follows outpatient nephrology with Dr. Almanza as outpatient. He has discussed dialysis with his nephrologist, but was told he did not require dialysis on his last vitis. Patient also reports history of HCV. He reportedly was treated in the past. He follows with GI for HCV, and per patient was still positive for HCV on his last visit 2-3 months prior. Patient reports he is not always compliant with home medications. Patient was found to have elevated Cr of 13 and oliguric. Nephrology consulted for initiation of HD.       #CKD progressed to ESRD   Baseline Cr ~ 10  Patient has history of active Hep C infection and CVOID PNA both of which could hae worsened and progressively worsen kidney function  Patient with nephrotic syndrome given, hypoalbuminemia, edema and nephrotic range proteinuria possible FSGS   Remains oliguric s/p IV lasix   Urine output around 500cc in 24hr   Mild uremic symptoms    Plan   IR to place PermCath tomorrow, and will initiate dialysis tomorrow 9/7  Continue to with Lasix   Hep panel   Please obtain Hep panel, Cryoglobulins, C3 and C4   Monitor Urine out put   Renal Diet   Renally dose meds   BID BMP   Monitor Potassium and for now will medically manage     #Anion Gap Metabolic Acidosis  2/2 to CKD , Bicarbonate of 15    Plan:  Continue with Sodium Bicarb until initiation of HD     #MBD  Calcium 6.6  Phosphorus 7.9    Plan:  Please obtain PTH, Vitamin D25   Daily Phosphorus   Start patient on Calcium acetate 667 2TABS TID with meals   Start patient on Calcitriol .5mcg PO daily   Low phos diet along with renal diet     #Anemia:  Hgb 9.2 not at goal   Please obtain iron panel   Will hold of EPO for now given uncontrolled BP, once controlled will initiate with HD     #HTN:  UF with HD   Continue with home antihypertensives

## 2022-09-06 NOTE — PROGRESS NOTE ADULT - PROBLEM SELECTOR PLAN 3
Pt. with anion gap of 20 on presentation. Likely secondary to elevated BUN in the setting of HUNG on CKD. VBG pH 7.25 which does not indicate need for urgent dialysis.     - will continue to monitor HUNG as above.  -plan for dialysis tomorrow  -Start NaHCO3- 650 mg BID for met acidosis until patient starts dialysis.

## 2022-09-06 NOTE — PROGRESS NOTE ADULT - ASSESSMENT
Assessment:    NEURO  No active issues.  #    CARDIO  No active issues.  #    PULM  No active issues.  #    GI  No active issues.  #    RENAL/  No active issues.  #    ID  No active issues.  #    HEME/ONC  No active issues.  #    DISPO  F:  E:  N:  GI PPX:  DVT PPX:  DISPO: 58yo M w/ a PMHx of HTN, DM, CKD presented w/ sob, abdominal distension, and LE edema x1wk. Found to have HUNG on CKD and HTN. Admitted for further management of care.

## 2022-09-06 NOTE — OCCUPATIONAL THERAPY INITIAL EVALUATION ADULT - LEVEL OF INDEPENDENCE: DRESS LOWER BODY, OT EVAL
don/doff B socks, limited by abdominal distension and discomfort with hip/trunk flexion, wife at bedside states shes been assist with LB dressing for past couple of days/minimum assist (75% patients effort)

## 2022-09-06 NOTE — PROGRESS NOTE ADULT - PROBLEM SELECTOR PLAN 8
Trop 0.29 on admission. Patient denies chest pain and no ischemic changes on EKG.   -troponin peaked, repeat troponin 0.24  - resolved

## 2022-09-06 NOTE — PROGRESS NOTE ADULT - ATTENDING COMMENTS
BP meds resumed and BP controlled. BP meds resumed and BP controlled. CKD worsening and need for HD discussed with renal. Agree with sodium bicarb for met acidosis secondary to renal failure.

## 2022-09-06 NOTE — PROGRESS NOTE ADULT - PROBLEM SELECTOR PLAN 6
- pt. with reported history of DM, but not on any medication    - f/u A1C  - start ISS Name band; Pt. with history of Hep C. He was reportedly treated as outpatient, but was still positive 2-3 months prior to admission. If pt. has cirrhotic findings on US, may be contributing to abdominal distention.     - f/u hep panel  - f/u liver US  -f/u GI recs  - f/u CT abdomen/pelvis    #distended abdomen, tympanitic on physical exam  -abd x-ray no sig findings

## 2022-09-06 NOTE — OCCUPATIONAL THERAPY INITIAL EVALUATION ADULT - MANUAL MUSCLE TESTING RESULTS, REHAB EVAL
BUE 5/5 throughout. BLE>3+/5 based on functional mobility assessment/no strength deficits were identified

## 2022-09-06 NOTE — PROGRESS NOTE ADULT - PROBLEM SELECTOR PLAN 2
/106 on admission. No AMS or chest pain but patient has elevated Cr (likely 2/2 CKD rather than HTN) and elevated troponin. On home HCTZ 12.5 qd and losartan 50mg qd. Received amlodipine 5mg, coreg 12.5, losartan 100mg, and lasix 40mg IV in ED. Likely 2/2 medication noncompliance vs volume overload i/s/o HUNG on CKD.    - monitor BP after getting multiple agents in ED  - c/w Amlodipine 10mg daily   - hold home losartan 50mg qd i/s/o HUNG  -f/u ECHO

## 2022-09-06 NOTE — PROGRESS NOTE ADULT - PROBLEM SELECTOR PLAN 1
h/o non-compliance with meds.  Pt stops meds if he gets edema.  On Norvasc 10 mg which previously he did stop. Current BP reasonable.

## 2022-09-06 NOTE — PHYSICAL THERAPY INITIAL EVALUATION ADULT - MANUAL MUSCLE TESTING RESULTS, REHAB EVAL
Recd 6 page fax from [facility].  Please complete Request for Medical Opinion form and fax to [phone].  Form in ZB in box.    Diamond Benton     >3+/5

## 2022-09-06 NOTE — PROGRESS NOTE ADULT - ASSESSMENT
58yo M w/ a PMHx of HTN, DM, CKD presented w/ sob, abdominal distension, and LE edema x1wk. Found to have HUNG on CKD and HTN.

## 2022-09-06 NOTE — CONSULT NOTE ADULT - ASSESSMENT
Assessment: 56 yo M with HTN, DM, CKD (baseline Cr. ~10) , HCV, COVID presented w/ sob, abdominal distension, and LE edema. Over the past 2 weeks he has noticed his abdomen expanding and increased LE edema. Over the past week he has become more dyspneic with exertion.. He follows outpatient nephrology with Dr. Almanza as outpatient. Patient was found to have elevated Cr of 13 and oliguric. IR consulted for tunneled HD catheter placement. Case reviewed with Dr. Min, plan for placement Wednesday as pt not NPO today.     Recommendations: NPO at midnight prior to procedure with sedation. Hold AM heparin subq. Obtain coags and repeat BMP in the morning.     Communicated with: primary team

## 2022-09-06 NOTE — OCCUPATIONAL THERAPY INITIAL EVALUATION ADULT - GENERAL OBSERVATIONS, REHAB EVAL
Pt received semi-supine in bed, +heplock, +lima, +abdominal distension, in NAD and agreeable to OT, wife at bedside. Cleared by SARITA Blue to see.

## 2022-09-07 ENCOUNTER — RESULT REVIEW (OUTPATIENT)
Age: 57
End: 2022-09-07

## 2022-09-07 DIAGNOSIS — K74.60 UNSPECIFIED CIRRHOSIS OF LIVER: ICD-10-CM

## 2022-09-07 LAB
24R-OH-CALCIDIOL SERPL-MCNC: 11.7 NG/ML — LOW (ref 30–80)
ALBUMIN FLD-MCNC: 1 G/DL — SIGNIFICANT CHANGE UP
ALBUMIN SERPL ELPH-MCNC: 2.7 G/DL — LOW (ref 3.3–5)
ALP SERPL-CCNC: 318 U/L — HIGH (ref 40–120)
ALT FLD-CCNC: 38 U/L — SIGNIFICANT CHANGE UP (ref 10–45)
ANION GAP SERPL CALC-SCNC: 18 MMOL/L — HIGH (ref 5–17)
APTT BLD: 30.2 SEC — SIGNIFICANT CHANGE UP (ref 27.5–35.5)
AST SERPL-CCNC: 44 U/L — HIGH (ref 10–40)
B PERT IGG+IGM PNL SER: CLEAR — SIGNIFICANT CHANGE UP
BASOPHILS # BLD AUTO: 0.04 K/UL — SIGNIFICANT CHANGE UP (ref 0–0.2)
BASOPHILS NFR BLD AUTO: 0.6 % — SIGNIFICANT CHANGE UP (ref 0–2)
BILIRUB SERPL-MCNC: 0.5 MG/DL — SIGNIFICANT CHANGE UP (ref 0.2–1.2)
BUN SERPL-MCNC: 100 MG/DL — HIGH (ref 7–23)
CALCIUM SERPL-MCNC: 6.7 MG/DL — LOW (ref 8.4–10.5)
CALCIUM SERPL-MCNC: 6.7 MG/DL — LOW (ref 8.4–10.5)
CHLORIDE SERPL-SCNC: 105 MMOL/L — SIGNIFICANT CHANGE UP (ref 96–108)
CO2 SERPL-SCNC: 16 MMOL/L — LOW (ref 22–31)
COLOR FLD: YELLOW — SIGNIFICANT CHANGE UP
CREAT SERPL-MCNC: 13.19 MG/DL — HIGH (ref 0.5–1.3)
EGFR: 4 ML/MIN/1.73M2 — LOW
EOSINOPHIL # BLD AUTO: 0.37 K/UL — SIGNIFICANT CHANGE UP (ref 0–0.5)
EOSINOPHIL NFR BLD AUTO: 5.8 % — SIGNIFICANT CHANGE UP (ref 0–6)
FLUID INTAKE SUBSTANCE CLASS: SIGNIFICANT CHANGE UP
GLUCOSE BLDC GLUCOMTR-MCNC: 133 MG/DL — HIGH (ref 70–99)
GLUCOSE FLD-MCNC: 103 MG/DL — SIGNIFICANT CHANGE UP
GLUCOSE SERPL-MCNC: 93 MG/DL — SIGNIFICANT CHANGE UP (ref 70–99)
GRAM STN FLD: SIGNIFICANT CHANGE UP
HBV CORE IGM SER-ACNC: SIGNIFICANT CHANGE UP
HCT VFR BLD CALC: 30.6 % — LOW (ref 39–50)
HGB BLD-MCNC: 10.4 G/DL — LOW (ref 13–17)
IMM GRANULOCYTES NFR BLD AUTO: 1.9 % — HIGH (ref 0–1.5)
INR BLD: 0.98 — SIGNIFICANT CHANGE UP (ref 0.88–1.16)
LDH SERPL L TO P-CCNC: 135 U/L — SIGNIFICANT CHANGE UP
LYMPHOCYTES # BLD AUTO: 0.9 K/UL — LOW (ref 1–3.3)
LYMPHOCYTES # BLD AUTO: 14 % — SIGNIFICANT CHANGE UP (ref 13–44)
LYMPHOCYTES # FLD: 23 % — SIGNIFICANT CHANGE UP
MAGNESIUM SERPL-MCNC: 2.3 MG/DL — SIGNIFICANT CHANGE UP (ref 1.6–2.6)
MCHC RBC-ENTMCNC: 28.1 PG — SIGNIFICANT CHANGE UP (ref 27–34)
MCHC RBC-ENTMCNC: 34 GM/DL — SIGNIFICANT CHANGE UP (ref 32–36)
MCV RBC AUTO: 82.7 FL — SIGNIFICANT CHANGE UP (ref 80–100)
MELD SCORE WITH DIALYSIS: 20 POINTS — SIGNIFICANT CHANGE UP
MELD SCORE WITHOUT DIALYSIS: 20 POINTS — SIGNIFICANT CHANGE UP
MESOTHL CELL # FLD: 4 % — SIGNIFICANT CHANGE UP
MONOCYTES # BLD AUTO: 0.73 K/UL — SIGNIFICANT CHANGE UP (ref 0–0.9)
MONOCYTES NFR BLD AUTO: 11.4 % — SIGNIFICANT CHANGE UP (ref 2–14)
MONOS+MACROS # FLD: 66 % — SIGNIFICANT CHANGE UP
NEUTROPHILS # BLD AUTO: 4.27 K/UL — SIGNIFICANT CHANGE UP (ref 1.8–7.4)
NEUTROPHILS NFR BLD AUTO: 66.3 % — SIGNIFICANT CHANGE UP (ref 43–77)
NEUTROPHILS-BODY FLUID: 7 % — SIGNIFICANT CHANGE UP
NRBC # BLD: 0 /100 WBCS — SIGNIFICANT CHANGE UP (ref 0–0)
PHOSPHATE SERPL-MCNC: 8.1 MG/DL — HIGH (ref 2.5–4.5)
PLATELET # BLD AUTO: 107 K/UL — LOW (ref 150–400)
POTASSIUM SERPL-MCNC: 4.8 MMOL/L — SIGNIFICANT CHANGE UP (ref 3.5–5.3)
POTASSIUM SERPL-SCNC: 4.8 MMOL/L — SIGNIFICANT CHANGE UP (ref 3.5–5.3)
PROT FLD-MCNC: 1.8 G/DL — SIGNIFICANT CHANGE UP
PROT SERPL-MCNC: 6.4 G/DL — SIGNIFICANT CHANGE UP (ref 6–8.3)
PROTHROM AB SERPL-ACNC: 11.6 SEC — SIGNIFICANT CHANGE UP (ref 10.5–13.4)
PTH-INTACT FLD-MCNC: 891 PG/ML — HIGH (ref 15–65)
RBC # BLD: 3.7 M/UL — LOW (ref 4.2–5.8)
RBC # FLD: 12.7 % — SIGNIFICANT CHANGE UP (ref 10.3–14.5)
RCV VOL RI: < 2000 /UL — SIGNIFICANT CHANGE UP (ref 0–0)
SODIUM SERPL-SCNC: 139 MMOL/L — SIGNIFICANT CHANGE UP (ref 135–145)
SPECIMEN SOURCE FLD: SIGNIFICANT CHANGE UP
SPECIMEN SOURCE: SIGNIFICANT CHANGE UP
TOTAL NUCLEATED CELL COUNT, BODY FLUID: 106 /UL — SIGNIFICANT CHANGE UP
TUBE TYPE: SIGNIFICANT CHANGE UP
WBC # BLD: 6.43 K/UL — SIGNIFICANT CHANGE UP (ref 3.8–10.5)
WBC # FLD AUTO: 6.43 K/UL — SIGNIFICANT CHANGE UP (ref 3.8–10.5)

## 2022-09-07 PROCEDURE — 36558 INSERT TUNNELED CV CATH: CPT

## 2022-09-07 PROCEDURE — 77001 FLUOROGUIDE FOR VEIN DEVICE: CPT | Mod: 26,59

## 2022-09-07 PROCEDURE — 99152 MOD SED SAME PHYS/QHP 5/>YRS: CPT

## 2022-09-07 PROCEDURE — 99447 NTRPROF PH1/NTRNET/EHR 11-20: CPT

## 2022-09-07 PROCEDURE — 90935 HEMODIALYSIS ONE EVALUATION: CPT

## 2022-09-07 PROCEDURE — 88305 TISSUE EXAM BY PATHOLOGIST: CPT | Mod: 26

## 2022-09-07 PROCEDURE — 99233 SBSQ HOSP IP/OBS HIGH 50: CPT | Mod: GC

## 2022-09-07 PROCEDURE — 88112 CYTOPATH CELL ENHANCE TECH: CPT | Mod: 26

## 2022-09-07 PROCEDURE — 76937 US GUIDE VASCULAR ACCESS: CPT | Mod: 26

## 2022-09-07 PROCEDURE — 99232 SBSQ HOSP IP/OBS MODERATE 35: CPT

## 2022-09-07 PROCEDURE — 99221 1ST HOSP IP/OBS SF/LOW 40: CPT

## 2022-09-07 PROCEDURE — 49083 ABD PARACENTESIS W/IMAGING: CPT

## 2022-09-07 RX ORDER — CALCIUM ACETATE 667 MG
1334 TABLET ORAL
Refills: 0 | Status: DISCONTINUED | OUTPATIENT
Start: 2022-09-07 | End: 2022-09-10

## 2022-09-07 RX ORDER — CALCITRIOL 0.5 UG/1
0.5 CAPSULE ORAL DAILY
Refills: 0 | Status: DISCONTINUED | OUTPATIENT
Start: 2022-09-07 | End: 2022-09-10

## 2022-09-07 RX ORDER — HEPARIN SODIUM 5000 [USP'U]/ML
5000 INJECTION INTRAVENOUS; SUBCUTANEOUS EVERY 8 HOURS
Refills: 0 | Status: DISCONTINUED | OUTPATIENT
Start: 2022-09-07 | End: 2022-09-09

## 2022-09-07 RX ORDER — CALCIUM ACETATE 667 MG
1334 TABLET ORAL
Refills: 0 | Status: DISCONTINUED | OUTPATIENT
Start: 2022-09-07 | End: 2022-09-07

## 2022-09-07 RX ORDER — LOSARTAN POTASSIUM 100 MG/1
50 TABLET, FILM COATED ORAL EVERY 24 HOURS
Refills: 0 | Status: DISCONTINUED | OUTPATIENT
Start: 2022-09-08 | End: 2022-09-10

## 2022-09-07 RX ADMIN — SENNA PLUS 2 TABLET(S): 8.6 TABLET ORAL at 22:03

## 2022-09-07 RX ADMIN — Medication 650 MILLIGRAM(S): at 16:03

## 2022-09-07 RX ADMIN — Medication 650 MILLIGRAM(S): at 14:23

## 2022-09-07 RX ADMIN — Medication 650 MILLIGRAM(S): at 17:03

## 2022-09-07 RX ADMIN — CALCITRIOL 0.5 MICROGRAM(S): 0.5 CAPSULE ORAL at 15:35

## 2022-09-07 RX ADMIN — POLYETHYLENE GLYCOL 3350 17 GRAM(S): 17 POWDER, FOR SOLUTION ORAL at 14:23

## 2022-09-07 RX ADMIN — AMLODIPINE BESYLATE 10 MILLIGRAM(S): 2.5 TABLET ORAL at 06:23

## 2022-09-07 RX ADMIN — HEPARIN SODIUM 5000 UNIT(S): 5000 INJECTION INTRAVENOUS; SUBCUTANEOUS at 22:02

## 2022-09-07 RX ADMIN — Medication 1334 MILLIGRAM(S): at 18:39

## 2022-09-07 NOTE — PROGRESS NOTE ADULT - SUBJECTIVE AND OBJECTIVE BOX
Patient is a 57y Male seen and evaluated at bedside.       Meds:    acetaminophen     Tablet .. 650 every 6 hours PRN  aluminum hydroxide/magnesium hydroxide/simethicone Suspension 30 every 4 hours PRN  amLODIPine   Tablet 10 every 24 hours  calcitriol   Capsule 0.5 daily  calcium acetate 1334 two times a day with meals  dextrose 5%. 1000 <Continuous>  dextrose 5%. 1000 <Continuous>  dextrose 50% Injectable 25 once  dextrose 50% Injectable 12.5 once  dextrose 50% Injectable 25 once  dextrose Oral Gel 15 once PRN  furosemide   Injectable 40 every 12 hours  glucagon  Injectable 1 once  influenza   Vaccine 0.5 once  insulin lispro (ADMELOG) corrective regimen sliding scale  Before meals and at bedtime  melatonin 3 at bedtime PRN  ondansetron Injectable 4 every 8 hours PRN  polyethylene glycol 3350 17 daily  senna 2 at bedtime  sodium bicarbonate 650 every 12 hours      T(C): , Max: 36.8 (22 @ 05:14)  T(F): , Max: 98.3 (22 @ 05:14)  HR: 73 (22 @ 11:50)  BP: 130/89 (22 @ 11:50)  BP(mean): --  RR: 18 (22 @ 11:50)  SpO2: 95% (22 @ 11:50)  Wt(kg): --     @ 07:01  -   @ 07:00  --------------------------------------------------------  IN: 100 mL / OUT: 150 mL / NET: -50 mL          Review of Systems:  ROS negative except as per HPI      PHYSICAL EXAM:  GENERAL: NAD  NECK: supple, No JVD  CHEST/LUNG: Clear to auscultation bilaterally  HEART: normal S1S2, RRR  ABDOMEN: Soft, Nontender, +BS, No flank tenderness bilateral  EXTREMITIES: No clubbing, cyanosis, or edema   NEUROLOGY: AAO x3, no focal neurological deficit  ACCESS: good thrill and bruit appreciated      LABS:                        10.4   6.43  )-----------( 107      ( 07 Sep 2022 07:00 )             30.6         139  |  105  |  100<H>  ----------------------------<  93  4.8   |  16<L>  |  13.19<H>    Ca    6.7<L>      07 Sep 2022 07:00  Phos  8.1       Mg     2.3         TPro  6.4  /  Alb  2.7<L>  /  TBili  0.5  /  DBili  x   /  AST  44<H>  /  ALT  38  /  AlkPhos  318<H>      Hepatitis B Surface Antibody: Nonreact ( @ 20:08)    PT/INR - ( 07 Sep 2022 07:00 )   PT: 11.6 sec;   INR: 0.98          PTT - ( 07 Sep 2022 07:00 )  PTT:30.2 sec  Urinalysis Basic - ( 05 Sep 2022 18:40 )    Color: Yellow / Appearance: Clear / S.020 / pH: x  Gluc: x / Ketone: NEGATIVE  / Bili: Negative / Urobili: 0.2 E.U./dL   Blood: x / Protein: 100 mg/dL / Nitrite: NEGATIVE   Leuk Esterase: NEGATIVE / RBC: 5-10 /HPF / WBC < 5 /HPF   Sq Epi: x / Non Sq Epi: 0-5 /HPF / Bacteria: Present /HPF      Sodium, Random Urine: 111 mmol/L ( @ 19:53)  Osmolality, Random Urine: 335 mosm/kg ( @ 19:53)  Creatinine, Random Urine: 45 mg/dL ( @ 19:53)  Chloride, Random Urine: 62 mmol/L ( @ 18:40)  Creatinine, Random Urine: 79 mg/dL ( @ 18:40)  Osmolality, Random Urine: 352 mosm/kg ( @ 18:40)  Potassium, Random Urine: 24 mmol/L ( @ 18:40)  Sodium, Random Urine: 83 mmol/L ( @ 18:40)        RADIOLOGY & ADDITIONAL STUDIES:           Patient is a 57y Male seen and evaluated at bedside. Patient seen during HD, offers no complaints. Patient tolerating HD well. Patient s/p paracentesis with removal of 2L.        Meds:    acetaminophen     Tablet .. 650 every 6 hours PRN  aluminum hydroxide/magnesium hydroxide/simethicone Suspension 30 every 4 hours PRN  amLODIPine   Tablet 10 every 24 hours  calcitriol   Capsule 0.5 daily  calcium acetate 1334 two times a day with meals  dextrose 5%. 1000 <Continuous>  dextrose 5%. 1000 <Continuous>  dextrose 50% Injectable 25 once  dextrose 50% Injectable 12.5 once  dextrose 50% Injectable 25 once  dextrose Oral Gel 15 once PRN  furosemide   Injectable 40 every 12 hours  glucagon  Injectable 1 once  influenza   Vaccine 0.5 once  insulin lispro (ADMELOG) corrective regimen sliding scale  Before meals and at bedtime  melatonin 3 at bedtime PRN  ondansetron Injectable 4 every 8 hours PRN  polyethylene glycol 3350 17 daily  senna 2 at bedtime  sodium bicarbonate 650 every 12 hours      T(C): , Max: 36.8 (22 @ 05:14)  T(F): , Max: 98.3 (22 @ 05:14)  HR: 73 (22 @ 11:50)  BP: 130/89 (22 @ 11:50)  BP(mean): --  RR: 18 (22 @ 11:50)  SpO2: 95% (22 @ 11:50)  Wt(kg): --     @ 07:01  -   @ 07:00  --------------------------------------------------------  IN: 100 mL / OUT: 150 mL / NET: -50 mL          Review of Systems:  ROS negative except as per HPI      PHYSICAL EXAM:  General: NAD, AAOx3  HEENT: atraumatic, normocephalic  Pulmonary: clear to auscultation bilaterally; No wheeze  Cardiovascular: Regular rate and rhythm; no murmurs, rubs or gallops. Normal S1S2  Gastrointestinal: firm, nontender, +distended; bowel sounds present, tympanitic abdomen  Musculoskeletal: 2+ peripheral pulses, no clubbing or cyanosis +Edema b/l LE  Neurology: Pt. alert and oriented, fluent speech, able to move all extremities  Skin: Multiple darkened skin nodules       LABS:                        10.4   6.43  )-----------( 107      ( 07 Sep 2022 07:00 )             30.6         139  |  105  |  100<H>  ----------------------------<  93  4.8   |  16<L>  |  13.19<H>    Ca    6.7<L>      07 Sep 2022 07:00  Phos  8.1       Mg     2.3         TPro  6.4  /  Alb  2.7<L>  /  TBili  0.5  /  DBili  x   /  AST  44<H>  /  ALT  38  /  AlkPhos  318<H>      Hepatitis B Surface Antibody: Nonreact ( @ 20:08)    PT/INR - ( 07 Sep 2022 07:00 )   PT: 11.6 sec;   INR: 0.98          PTT - ( 07 Sep 2022 07:00 )  PTT:30.2 sec  Urinalysis Basic - ( 05 Sep 2022 18:40 )    Color: Yellow / Appearance: Clear / S.020 / pH: x  Gluc: x / Ketone: NEGATIVE  / Bili: Negative / Urobili: 0.2 E.U./dL   Blood: x / Protein: 100 mg/dL / Nitrite: NEGATIVE   Leuk Esterase: NEGATIVE / RBC: 5-10 /HPF / WBC < 5 /HPF   Sq Epi: x / Non Sq Epi: 0-5 /HPF / Bacteria: Present /HPF      Sodium, Random Urine: 111 mmol/L ( @ 19:53)  Osmolality, Random Urine: 335 mosm/kg ( @ 19:53)  Creatinine, Random Urine: 45 mg/dL ( @ 19:53)  Chloride, Random Urine: 62 mmol/L ( @ 18:40)  Creatinine, Random Urine: 79 mg/dL ( @ 18:40)  Osmolality, Random Urine: 352 mosm/kg ( @ 18:40)  Potassium, Random Urine: 24 mmol/L ( @ 18:40)  Sodium, Random Urine: 83 mmol/L ( @ 18:40)        RADIOLOGY & ADDITIONAL STUDIES:           Patient is a 57y Male seen and evaluated at bedside. Patient seen during HD, offers no complaints. Patient tolerating HD well. Patient s/p paracentesis with removal of 2L.        Meds:    acetaminophen     Tablet .. 650 every 6 hours PRN  aluminum hydroxide/magnesium hydroxide/simethicone Suspension 30 every 4 hours PRN  amLODIPine   Tablet 10 every 24 hours  calcitriol   Capsule 0.5 daily  calcium acetate 1334 two times a day with meals  dextrose 5%. 1000 <Continuous>  dextrose 5%. 1000 <Continuous>  dextrose 50% Injectable 25 once  dextrose 50% Injectable 12.5 once  dextrose 50% Injectable 25 once  dextrose Oral Gel 15 once PRN  furosemide   Injectable 40 every 12 hours  glucagon  Injectable 1 once  influenza   Vaccine 0.5 once  insulin lispro (ADMELOG) corrective regimen sliding scale  Before meals and at bedtime  melatonin 3 at bedtime PRN  ondansetron Injectable 4 every 8 hours PRN  polyethylene glycol 3350 17 daily  senna 2 at bedtime  sodium bicarbonate 650 every 12 hours      T(C): , Max: 36.8 (22 @ 05:14)  T(F): , Max: 98.3 (22 @ 05:14)  HR: 73 (22 @ 11:50)  BP: 130/89 (22 @ 11:50)  BP(mean): --  RR: 18 (22 @ 11:50)  SpO2: 95% (22 @ 11:50)  Wt(kg): --     @ 07:01  -   @ 07:00  --------------------------------------------------------  IN: 100 mL / OUT: 150 mL / NET: -50 mL          Review of Systems:  ROS negative except as per HPI      PHYSICAL EXAM:  General: NAD, AAOx3  HEENT: atraumatic, normocephalic  Pulmonary: clear to auscultation bilaterally; No wheeze  Cardiovascular: Regular rate and rhythm; no murmurs, rubs or gallops. Normal S1S2  Gastrointestinal: firm, nontender, +distended; bowel sounds present, tympanitic abdomen  Musculoskeletal: 2+ peripheral pulses, no clubbing or cyanosis +Edema b/l LE  Neurology: Pt. alert and oriented, fluent speech, able to move all extremities  Skin: Multiple darkened skin nodules   Access: left if PermCath       LABS:                        10.4   6.43  )-----------( 107      ( 07 Sep 2022 07:00 )             30.6         139  |  105  |  100<H>  ----------------------------<  93  4.8   |  16<L>  |  13.19<H>    Ca    6.7<L>      07 Sep 2022 07:00  Phos  8.1       Mg     2.3         TPro  6.4  /  Alb  2.7<L>  /  TBili  0.5  /  DBili  x   /  AST  44<H>  /  ALT  38  /  AlkPhos  318<H>      Hepatitis B Surface Antibody: Nonreact ( @ 20:08)    PT/INR - ( 07 Sep 2022 07:00 )   PT: 11.6 sec;   INR: 0.98          PTT - ( 07 Sep 2022 07:00 )  PTT:30.2 sec  Urinalysis Basic - ( 05 Sep 2022 18:40 )    Color: Yellow / Appearance: Clear / S.020 / pH: x  Gluc: x / Ketone: NEGATIVE  / Bili: Negative / Urobili: 0.2 E.U./dL   Blood: x / Protein: 100 mg/dL / Nitrite: NEGATIVE   Leuk Esterase: NEGATIVE / RBC: 5-10 /HPF / WBC < 5 /HPF   Sq Epi: x / Non Sq Epi: 0-5 /HPF / Bacteria: Present /HPF      Sodium, Random Urine: 111 mmol/L ( @ 19:53)  Osmolality, Random Urine: 335 mosm/kg ( @ 19:53)  Creatinine, Random Urine: 45 mg/dL ( @ 19:53)  Chloride, Random Urine: 62 mmol/L ( @ 18:40)  Creatinine, Random Urine: 79 mg/dL ( @ 18:40)  Osmolality, Random Urine: 352 mosm/kg ( @ 18:40)  Potassium, Random Urine: 24 mmol/L ( @ 18:40)  Sodium, Random Urine: 83 mmol/L ( @ 18:40)        RADIOLOGY & ADDITIONAL STUDIES:

## 2022-09-07 NOTE — PROGRESS NOTE ADULT - SUBJECTIVE AND OBJECTIVE BOX
INTERVAL HISTORY:  No new c/o  	  MEDICATIONS:  amLODIPine   Tablet 10 milliGRAM(s) Oral every 24 hours  furosemide   Injectable 40 milliGRAM(s) IV Push every 12 hours    acetaminophen     Tablet .. 650 milliGRAM(s) Oral every 6 hours PRN  melatonin 3 milliGRAM(s) Oral at bedtime PRN  ondansetron Injectable 4 milliGRAM(s) IV Push every 8 hours PRN    aluminum hydroxide/magnesium hydroxide/simethicone Suspension 30 milliLiter(s) Oral every 4 hours PRN  polyethylene glycol 3350 17 Gram(s) Oral daily  senna 2 Tablet(s) Oral at bedtime    dextrose 50% Injectable 25 Gram(s) IV Push once  dextrose 50% Injectable 12.5 Gram(s) IV Push once  dextrose 50% Injectable 25 Gram(s) IV Push once  dextrose Oral Gel 15 Gram(s) Oral once PRN  glucagon  Injectable 1 milliGRAM(s) IntraMuscular once  insulin lispro (ADMELOG) corrective regimen sliding scale   SubCutaneous Before meals and at bedtime    calcitriol   Capsule 0.5 MICROGram(s) Oral daily  calcium acetate 1334 milliGRAM(s) Oral two times a day with meals  dextrose 5%. 1000 milliLiter(s) IV Continuous <Continuous>  dextrose 5%. 1000 milliLiter(s) IV Continuous <Continuous>  influenza   Vaccine 0.5 milliLiter(s) IntraMuscular once  sodium bicarbonate 650 milliGRAM(s) Oral every 12 hours        PHYSICAL EXAM:  T(C): 36.8 (09-07-22 @ 05:14), Max: 36.8 (09-07-22 @ 05:14)  HR: 75 (09-07-22 @ 05:14) (65 - 77)  BP: 146/88 (09-07-22 @ 05:14) (140/88 - 179/104)  RR: 19 (09-07-22 @ 05:14) (18 - 19)  SpO2: 97% (09-07-22 @ 05:14) (96% - 100%)  Wt(kg): --  I&O's Summary    06 Sep 2022 07:01  -  07 Sep 2022 07:00  --------------------------------------------------------  IN: 100 mL / OUT: 150 mL / NET: -50 mL          Appearance: Normal	  Cardiovascular: Normal S1 S2, No JVD, No murmurs, No edema  Respiratory: Lungs clear to auscultation	  Psychiatry: A & O x 3, Mood & affect appropriate  Gastrointestinal:  Soft, Non-tender, + BS	  Skin: No rashes, No ecchymoses, No cyanosis  Neurologic: Non-focal  Extremities:No clubbing, cyanosis or edema  Vascular: Peripheral pulses palpable 2+ bilaterally    TELEMETRY: 	    ECG:  	  RADIOLOGY:   DIAGNOSTIC TESTING:  [ ] Echocardiogram:  [ ]  Catheterization:  [ ] Stress Test:    OTHER: 	    LABS:	 	    CARDIAC MARKERS:                                  9.2    5.43  )-----------( 105      ( 06 Sep 2022 05:30 )             27.9     09-06    140  |  107  |  90<H>  ----------------------------<  123<H>  4.8   |  15<L>  |  13.51<H>    Ca    6.3<LL>      06 Sep 2022 05:30  Phos  7.9     09-06  Mg     2.5     09-06    TPro  5.9<L>  /  Alb  2.6<L>  /  TBili  0.4  /  DBili  x   /  AST  40  /  ALT  38  /  AlkPhos  293<H>  09-06    proBNP:   Lipid Profile:   HgA1c:   TSH:     ASSESSMENT/PLAN:

## 2022-09-07 NOTE — PROGRESS NOTE ADULT - PROBLEM SELECTOR PLAN 1
Cr 13.27 (baseline Cr 10). Follows outpatient w/ nephrologist Dr. Almanza. Likely progressed from CKD to ESRD. Underwent HD catheter placement with IR today, along with first session of HD.    - Nephrology following, f/u recs  - f/u pth, vit d. Hb not at goal, f/u iron panel  - phoslo bid w/ meals  - adjust medication dosages for level of renal function  - strict I/Os and daily weights  - vascular consulted for AVF vein mapping and outpatient AVF formation

## 2022-09-07 NOTE — CONSULT NOTE ADULT - ATTENDING COMMENTS
As per above.  Patient wants to get AVF access at OSH where his nephrologist works.  Can see me as outpatient if prefers.

## 2022-09-07 NOTE — PROGRESS NOTE ADULT - PROBLEM SELECTOR PLAN 8
Hgb 9.8 on admission (unknown baseline Hgb). No melena, hematochezia, or signs of active bleeding. Possibly 2/2 CKD.    -iron panel c/w AOCD  -trend CBC  -maintain active T&S  -transfuse if Hgb <7

## 2022-09-07 NOTE — PROGRESS NOTE ADULT - PROBLEM SELECTOR PLAN 9
Trop 0.29 on admission. Patient denies chest pain and no ischemic changes on EKG. Likely 2/2 HUNG on CKD  -troponin peaked, repeat troponin 0.24  - resolved

## 2022-09-07 NOTE — PROGRESS NOTE ADULT - ASSESSMENT
56 yo M with HTN, DM, CKD, HCV presented w/ sob, abdominal distension, and LE edema. GI consulted for abd distension.     #Abd distension  - some degree of distension in setting of anasarca due to renal failure; however, also had ileus in setting of illness as evidenced by imaging and corroborated with tympanitic gaseous distension  - encourage ambulation, hydration, and can rx miralax, senna to aid in BM's; he is passing flatus, but has not had a BM     #Viral hepatitis  - HCV RNA positive; will need outpatient f/u to assess what he has been treated with in the past, if any regimen  - HBV total core Ab is reactive with surface Ab and core IgM negative, which could be interpreted as false positive total core Ab, chronic infection with very low level surface antigen, or prior infection and waning immunity thus undetectable surface Ab; window period and recovery from recent infection unlikely given IgM negative  - nonetheless, please obtain HBV DNA   - will need outpatient HCC screening with most likely MRI   - will arrange f/u with Dr. Ean Prajapati, [facility] hepatologist     Thank you for allowing us to participate in the care of this patient.  GI will sign off. Please call back with any questions or concerns.     Vivienne Hernandez MD  PGY-6, Gastroenterology Fellow  pager: [phone]

## 2022-09-07 NOTE — PROGRESS NOTE ADULT - ASSESSMENT
56yo M w/ a PMHx of HTN, DM, CKD presented w/ sob, abdominal distension, and LE edema x1wk. Found to have HUNG on CKD and HTN.

## 2022-09-07 NOTE — CONSULT NOTE ADULT - ASSESSMENT
Assessment: 58 yo M with HTN, DM, CKD (baseline Cr. ~10) , HCV, COVID  presented w/ sob, abdominal distension, and LE edema. Over the past 2 weeks he has noticed his abdomen expanding and increased LE edema. Over the past week he has become more dyspneic with exertion. IR consulted for paracentesis. Case reviewed with Dr. Harrell, plan for procedure with local anesthesia.    Recommendations: Please ensure Covid swab is up to date (within last 72 hours).    Communicated with: primary team

## 2022-09-07 NOTE — PROGRESS NOTE ADULT - PROBLEM SELECTOR PLAN 3
Most likely due to CKD.  Would check echo.  No plans for a cath, no dye load at this time.    I will be away until Sept 13th.  No acute cardiac issues.

## 2022-09-07 NOTE — CONSULT NOTE ADULT - ASSESSMENT
56 yo M with HTN, DM, CKD, HCV presented w/ sob, abdominal distension, and LE edema, found to be in ARF (known CKD), now new to HD via R IJ permacath.    - Please obtain b/l UE vein mapping for AVF planning  - Please place pink "DO NOT USE" band on L arm; avoid BPs, IVs, lab draws, etc. in L arm  - Patient and family would prefer to pursue AVF creation as an outpatient after being discharged, would like to discuss with their outpatient nephrologist  - Patient should follow-up Dr. Reno as an outpatient for fistula planning. Call [phone] for appointments.

## 2022-09-07 NOTE — PROGRESS NOTE ADULT - ASSESSMENT
56 yo M with HTN, DM, CKD (baseline Cr. ~10) , HCV, COVID  presented w/ sob, abdominal distension, and LE edema. Over the past 2 weeks he has noticed his abdomen expanding and increased LE edema. Over the past week he has become more dyspneic with exertion.. He follows outpatient nephrology with Dr. Almanza as outpatient. He has discussed dialysis with his nephrologist, but was told he did not require dialysis on his last vitis. Patient also reports history of HCV. He reportedly was treated in the past. He follows with GI for HCV, and per patient was still positive for HCV on his last visit 2-3 months prior. Patient reports he is not always compliant with home medications. Patient was found to have elevated Cr of 13 and oliguric. Nephrology consulted for initiation of HD.       #CKD progressed to ESRD   Baseline Cr ~ 10  Patient has history of active Hep C infection and CVOID PNA both of which could hae worsened and progressively worsen kidney function  Patient with nephrotic syndrome given, hypoalbuminemia, edema and nephrotic range proteinuria possible FSGS   Remains oliguric s/p IV lasix   Urine output around 500cc in 24hr   Mild uremic symptoms    Plan   HD today patient tolerating well    Hemodialysis Treatment.:     Schedule: Once, Modality: Hemodialysis, Access: Subclavian Central Venous Catheter    Dialyzer: Optiflux [id number], Time: 120 Min    Blood Flow: 200 mL/Min , Dialysate Flow: 500 mL/Min, Dialysate Temp: 36.5, Tubinmm (Adult)    Target Fluid Removal: 1.5 Liters    Dialysate Electrolytes (mEq/L): Potassium 3, Calcium 2.5, Sodium 138, Bicarbonate 35   Discontinue Lasix    Remove Avitia   follow up Hep panel, Cryoglobulins, C3 and C4   Renal Diet   Renally dose meds   Daily BMP     #Anion Gap Metabolic Acidosis  2/2 to CKD , Bicarbonate of 15  Started on HD     Plan:  Stop  Sodium Bicarb until initiation of HD     #MBD  Calcium 6.7  Phosphorus 8.1    Plan:  Please obtain PTH, Vitamin D25   Daily Phosphorus   c/w Calcium acetate 667 2TABS TID with meals   c/w Calcitriol .5mcg PO daily   Low phos diet along with renal diet     #Anemia:  Hgb 10.4  at goal       #HTN:  UF with HD   Continue with home antihypertensives

## 2022-09-07 NOTE — PROGRESS NOTE ADULT - ATTENDING COMMENTS
Patient was seen and examined at bedside on 9/7/2022 at 1230 pm. Patient has no acute complaints - feels improved. Denies SOB, CP. ROS is otherwise negative. Vitals, labwork and pertinent imaging reviewed - elevated Cr. Physical exam - NAD, AAO x 4, PERRLA, EOMI, MMM, supple neck, chest - CTA b/l, CV - rrr, s1s2, no m/r/g, abd - soft, NTND, + BS, ext - wwp, psych - normal affect, skin - no rash    Plan  -Check TTE  -For HD today  -s/p IR w/ diagnostic/therapeutic para - studies c/w Cirrhosis  -Vascular consulted for possible fistula planning, GI on board as well given cirrhosis  -Pt is medically ready for discharge

## 2022-09-07 NOTE — PROGRESS NOTE ADULT - PROBLEM SELECTOR PLAN 6
Pt. with history of Hep C. He was reportedly treated as outpatient, but was still positive 2-3 months prior to admission. If pt. has cirrhotic findings on US, may be contributing to abdominal distention.     - f/u hep panel  - f/u liver US  - f/u GI recs  - f/u CT abdomen/pelvis    #distended abdomen, tympanitic on physical exam  -abd x-ray no sig findings

## 2022-09-07 NOTE — PROGRESS NOTE ADULT - PROBLEM SELECTOR PLAN 1
h/o non-compliance with meds.  Pt stops meds if he gets edema.  On Norvasc 10 mg which previously he did stop. Current BP reasonable.  Office note left in hospital chart.

## 2022-09-07 NOTE — PROGRESS NOTE ADULT - PROBLEM SELECTOR PLAN 3
/106 on admission. No AMS or chest pain but patient has elevated Cr (likely 2/2 CKD rather than HTN) and elevated troponin. On home HCTZ 12.5 qd and losartan 50mg qd. Received amlodipine 5mg, coreg 12.5, losartan 100mg, and lasix 40mg IV in ED. Likely 2/2 medication noncompliance vs volume overload i/s/o CKD    - monitor BP  - patient unagreeable to amlodipine due to swelling of legs  - ok to restart home losartan 50mg qd  - f/u ECHO

## 2022-09-07 NOTE — PROGRESS NOTE ADULT - PROBLEM SELECTOR PLAN 2
Cr 13, to be evaluated for dialysis.  Consider loop diuretic for fluid management.  AM labs pending.

## 2022-09-07 NOTE — PROGRESS NOTE ADULT - PROBLEM SELECTOR PLAN 4
Pt. with anion gap of 20 on presentation. Likely secondary to elevated BUN in the setting of CKD. Expect improvement with HD    - ok to stop bicarb

## 2022-09-07 NOTE — PROGRESS NOTE ADULT - PROBLEM SELECTOR PLAN 2
Patient with new onset ascites present on both CT and US imaging. Liver cirrhosis also present on imaging, likel 2/2 hx of Hep C. Patient self-reported completing HepC treatment, however remains HepC positive. Exact antiviral and completion of HepC treatment is unclear as it is typically extremely effective.     - GI following, f/u recs. HBV DNA ordered.  - Underwent diagnostic paracentesis today with IR - peritoneal fluid studies consistent with cirrhosis

## 2022-09-07 NOTE — PROGRESS NOTE ADULT - SUBJECTIVE AND OBJECTIVE BOX
GASTROENTEROLOGY PROGRESS NOTE  Patient seen and examined at bedside. No acute complaints.     PERTINENT REVIEW OF SYSTEMS:  CONSTITUTIONAL: No weakness, fevers or chills  HEENT: No visual changes; No vertigo or throat pain   GASTROINTESTINAL: As above.  NEUROLOGICAL: No numbness or weakness  SKIN: No itching, burning, rashes, or lesions     Allergies    penicillin (Rash)    Intolerances      MEDICATIONS:  MEDICATIONS  (STANDING):  amLODIPine   Tablet 10 milliGRAM(s) Oral every 24 hours  calcitriol   Capsule 0.5 MICROGram(s) Oral daily  calcium acetate 1334 milliGRAM(s) Oral two times a day with meals  dextrose 5%. 1000 milliLiter(s) (100 mL/Hr) IV Continuous <Continuous>  dextrose 5%. 1000 milliLiter(s) (50 mL/Hr) IV Continuous <Continuous>  dextrose 50% Injectable 25 Gram(s) IV Push once  dextrose 50% Injectable 12.5 Gram(s) IV Push once  dextrose 50% Injectable 25 Gram(s) IV Push once  furosemide   Injectable 40 milliGRAM(s) IV Push every 12 hours  glucagon  Injectable 1 milliGRAM(s) IntraMuscular once  influenza   Vaccine 0.5 milliLiter(s) IntraMuscular once  insulin lispro (ADMELOG) corrective regimen sliding scale   SubCutaneous Before meals and at bedtime  polyethylene glycol 3350 17 Gram(s) Oral daily  senna 2 Tablet(s) Oral at bedtime  sodium bicarbonate 650 milliGRAM(s) Oral every 12 hours    MEDICATIONS  (PRN):  acetaminophen     Tablet .. 650 milliGRAM(s) Oral every 6 hours PRN Temp greater or equal to 38C (100.4F), Mild Pain (1 - 3)  aluminum hydroxide/magnesium hydroxide/simethicone Suspension 30 milliLiter(s) Oral every 4 hours PRN Dyspepsia  dextrose Oral Gel 15 Gram(s) Oral once PRN Blood Glucose LESS THAN 70 milliGRAM(s)/deciliter  melatonin 3 milliGRAM(s) Oral at bedtime PRN Insomnia  ondansetron Injectable 4 milliGRAM(s) IV Push every 8 hours PRN Nausea and/or Vomiting    Vital Signs Last 24 Hrs  T(C): 36.8 (07 Sep 2022 05:14), Max: 36.8 (07 Sep 2022 05:14)  T(F): 98.3 (07 Sep 2022 05:14), Max: 98.3 (07 Sep 2022 05:14)  HR: 75 (07 Sep 2022 05:14) (65 - 77)  BP: 146/88 (07 Sep 2022 05:14) (146/88 - 179/104)  BP(mean): --  RR: 19 (07 Sep 2022 05:14) (18 - 19)  SpO2: 97% (07 Sep 2022 05:14) (96% - 97%)    Parameters below as of 07 Sep 2022 05:14  Patient On (Oxygen Delivery Method): room air         @ 07:01  -   @ 07:00  --------------------------------------------------------  IN: 100 mL / OUT: 150 mL / NET: -50 mL      PHYSICAL EXAM:    General: in no acute distress  HEENT: MMM, conjunctiva and sclera clear  Gastrointestinal: Soft non-tender moderately distended; No rebound or guarding  Skin: Warm and dry. No obvious rash    LABS:                        10.4   6.43  )-----------( 107      ( 07 Sep 2022 07:00 )             30.6         139  |  105  |  100<H>  ----------------------------<  93  4.8   |  16<L>  |  13.19<H>    Ca    6.7<L>      07 Sep 2022 07:00  Phos  8.1       Mg     2.3         TPro  6.4  /  Alb  2.7<L>  /  TBili  0.5  /  DBili  x   /  AST  44<H>  /  ALT  38  /  AlkPhos  318<H>      PT/INR - ( 07 Sep 2022 07:00 )   PT: 11.6 sec;   INR: 0.98          PTT - ( 07 Sep 2022 07:00 )  PTT:30.2 sec      Urinalysis Basic - ( 05 Sep 2022 18:40 )    Color: Yellow / Appearance: Clear / S.020 / pH: x  Gluc: x / Ketone: NEGATIVE  / Bili: Negative / Urobili: 0.2 E.U./dL   Blood: x / Protein: 100 mg/dL / Nitrite: NEGATIVE   Leuk Esterase: NEGATIVE / RBC: 5-10 /HPF / WBC < 5 /HPF   Sq Epi: x / Non Sq Epi: 0-5 /HPF / Bacteria: Present /HPF                Urinalysis with Rflx Culture (collected 05 Sep 2022 18:40)    Urinalysis with Rflx Culture (collected 05 Sep 2022 16:42)      RADIOLOGY & ADDITIONAL STUDIES:  Reviewed

## 2022-09-07 NOTE — PROGRESS NOTE ADULT - SUBJECTIVE AND OBJECTIVE BOX
O/N Events:    Subjective/ROS: Patient seen and examined at bedside.     Denies Fever/Chills, HA, CP, SOB, n/v, changes in bowel/urinary habits.  12pt ROS otherwise negative.    VITALS  Vital Signs Last 24 Hrs  T(C): 36.8 (07 Sep 2022 05:14), Max: 36.8 (07 Sep 2022 05:14)  T(F): 98.3 (07 Sep 2022 05:14), Max: 98.3 (07 Sep 2022 05:14)  HR: 75 (07 Sep 2022 05:14) (65 - 77)  BP: 146/88 (07 Sep 2022 05:14) (140/88 - 179/104)  BP(mean): 107 (06 Sep 2022 10:15) (107 - 107)  RR: 19 (07 Sep 2022 05:14) (18 - 19)  SpO2: 97% (07 Sep 2022 05:14) (96% - 100%)    Parameters below as of 07 Sep 2022 05:14  Patient On (Oxygen Delivery Method): room air        CAPILLARY BLOOD GLUCOSE      POCT Blood Glucose.: 148 mg/dL (06 Sep 2022 21:56)  POCT Blood Glucose.: 149 mg/dL (06 Sep 2022 17:05)  POCT Blood Glucose.: 114 mg/dL (06 Sep 2022 11:48)      PHYSICAL EXAM  General: NAD  Head: NC/AT; MMM; PERRL; EOMI;  Neck: Supple; no JVD  Respiratory: CTAB; no wheezes/rales/rhonchi  Cardiovascular: Regular rhythm/rate; S1/S2+, no murmurs, rubs gallops   Gastrointestinal: Soft; NTND; bowel sounds normal and present  Extremities: WWP; no edema/cyanosis  Neurological: A&Ox3, CNII-XII grossly intact; no obvious focal deficits    MEDICATIONS  (STANDING):  amLODIPine   Tablet 10 milliGRAM(s) Oral every 24 hours  calcitriol   Capsule 0.5 MICROGram(s) Oral daily  calcium acetate 1334 milliGRAM(s) Oral two times a day with meals  dextrose 5%. 1000 milliLiter(s) (100 mL/Hr) IV Continuous <Continuous>  dextrose 5%. 1000 milliLiter(s) (50 mL/Hr) IV Continuous <Continuous>  dextrose 50% Injectable 25 Gram(s) IV Push once  dextrose 50% Injectable 12.5 Gram(s) IV Push once  dextrose 50% Injectable 25 Gram(s) IV Push once  furosemide   Injectable 40 milliGRAM(s) IV Push every 12 hours  glucagon  Injectable 1 milliGRAM(s) IntraMuscular once  influenza   Vaccine 0.5 milliLiter(s) IntraMuscular once  insulin lispro (ADMELOG) corrective regimen sliding scale   SubCutaneous Before meals and at bedtime  polyethylene glycol 3350 17 Gram(s) Oral daily  senna 2 Tablet(s) Oral at bedtime  sodium bicarbonate 650 milliGRAM(s) Oral every 12 hours    MEDICATIONS  (PRN):  acetaminophen     Tablet .. 650 milliGRAM(s) Oral every 6 hours PRN Temp greater or equal to 38C (100.4F), Mild Pain (1 - 3)  aluminum hydroxide/magnesium hydroxide/simethicone Suspension 30 milliLiter(s) Oral every 4 hours PRN Dyspepsia  dextrose Oral Gel 15 Gram(s) Oral once PRN Blood Glucose LESS THAN 70 milliGRAM(s)/deciliter  melatonin 3 milliGRAM(s) Oral at bedtime PRN Insomnia  ondansetron Injectable 4 milliGRAM(s) IV Push every 8 hours PRN Nausea and/or Vomiting      penicillin (Rash)      LABS                        10.4   6.43  )-----------( 107      ( 07 Sep 2022 07:00 )             30.6         139  |  105  |  100<H>  ----------------------------<  93  4.8   |  16<L>  |  13.19<H>    Ca    6.7<L>      07 Sep 2022 07:00  Phos  8.1       Mg     2.3         TPro  6.4  /  Alb  2.7<L>  /  TBili  0.5  /  DBili  x   /  AST  44<H>  /  ALT  38  /  AlkPhos  318<H>      PT/INR - ( 07 Sep 2022 07:00 )   PT: 11.6 sec;   INR: 0.98          PTT - ( 07 Sep 2022 07:00 )  PTT:30.2 sec  Urinalysis Basic - ( 05 Sep 2022 18:40 )    Color: Yellow / Appearance: Clear / S.020 / pH: x  Gluc: x / Ketone: NEGATIVE  / Bili: Negative / Urobili: 0.2 E.U./dL   Blood: x / Protein: 100 mg/dL / Nitrite: NEGATIVE   Leuk Esterase: NEGATIVE / RBC: 5-10 /HPF / WBC < 5 /HPF   Sq Epi: x / Non Sq Epi: 0-5 /HPF / Bacteria: Present /HPF      CARDIAC MARKERS ( 05 Sep 2022 22:21 )  x     / 0.24 ng/mL / 449 U/L / x     / 4.3 ng/mL  CARDIAC MARKERS ( 05 Sep 2022 16:50 )  x     / 0.29 ng/mL / x     / x     / x              IMAGING/EKG/ETC   O/N Events:     Subjective/ROS: Patient seen and examined at bedside.     Denies Fever/Chills, HA, CP, SOB, n/v, changes in bowel/urinary habits.  12pt ROS otherwise negative.    VITALS  Vital Signs Last 24 Hrs  T(C): 36.8 (07 Sep 2022 05:14), Max: 36.8 (07 Sep 2022 05:14)  T(F): 98.3 (07 Sep 2022 05:14), Max: 98.3 (07 Sep 2022 05:14)  HR: 75 (07 Sep 2022 05:14) (65 - 77)  BP: 146/88 (07 Sep 2022 05:14) (140/88 - 179/104)  BP(mean): 107 (06 Sep 2022 10:15) (107 - 107)  RR: 19 (07 Sep 2022 05:14) (18 - 19)  SpO2: 97% (07 Sep 2022 05:14) (96% - 100%)    Parameters below as of 07 Sep 2022 05:14  Patient On (Oxygen Delivery Method): room air        CAPILLARY BLOOD GLUCOSE      POCT Blood Glucose.: 148 mg/dL (06 Sep 2022 21:56)  POCT Blood Glucose.: 149 mg/dL (06 Sep 2022 17:05)  POCT Blood Glucose.: 114 mg/dL (06 Sep 2022 11:48)      PHYSICAL EXAM  General: NAD  Head: NC/AT; MMM; PERRL; EOMI;  Neck: Supple; no JVD  Respiratory: CTAB; no wheezes/rales/rhonchi  Cardiovascular: Regular rhythm/rate; S1/S2+, no murmurs, rubs gallops   Gastrointestinal: Soft; NTND; bowel sounds normal and present  Extremities: WWP; no edema/cyanosis  Neurological: A&Ox3, CNII-XII grossly intact; no obvious focal deficits    MEDICATIONS  (STANDING):  amLODIPine   Tablet 10 milliGRAM(s) Oral every 24 hours  calcitriol   Capsule 0.5 MICROGram(s) Oral daily  calcium acetate 1334 milliGRAM(s) Oral two times a day with meals  dextrose 5%. 1000 milliLiter(s) (100 mL/Hr) IV Continuous <Continuous>  dextrose 5%. 1000 milliLiter(s) (50 mL/Hr) IV Continuous <Continuous>  dextrose 50% Injectable 25 Gram(s) IV Push once  dextrose 50% Injectable 12.5 Gram(s) IV Push once  dextrose 50% Injectable 25 Gram(s) IV Push once  furosemide   Injectable 40 milliGRAM(s) IV Push every 12 hours  glucagon  Injectable 1 milliGRAM(s) IntraMuscular once  influenza   Vaccine 0.5 milliLiter(s) IntraMuscular once  insulin lispro (ADMELOG) corrective regimen sliding scale   SubCutaneous Before meals and at bedtime  polyethylene glycol 3350 17 Gram(s) Oral daily  senna 2 Tablet(s) Oral at bedtime  sodium bicarbonate 650 milliGRAM(s) Oral every 12 hours    MEDICATIONS  (PRN):  acetaminophen     Tablet .. 650 milliGRAM(s) Oral every 6 hours PRN Temp greater or equal to 38C (100.4F), Mild Pain (1 - 3)  aluminum hydroxide/magnesium hydroxide/simethicone Suspension 30 milliLiter(s) Oral every 4 hours PRN Dyspepsia  dextrose Oral Gel 15 Gram(s) Oral once PRN Blood Glucose LESS THAN 70 milliGRAM(s)/deciliter  melatonin 3 milliGRAM(s) Oral at bedtime PRN Insomnia  ondansetron Injectable 4 milliGRAM(s) IV Push every 8 hours PRN Nausea and/or Vomiting      penicillin (Rash)      LABS                        10.4   6.43  )-----------( 107      ( 07 Sep 2022 07:00 )             30.6         139  |  105  |  100<H>  ----------------------------<  93  4.8   |  16<L>  |  13.19<H>    Ca    6.7<L>      07 Sep 2022 07:00  Phos  8.1       Mg     2.3         TPro  6.4  /  Alb  2.7<L>  /  TBili  0.5  /  DBili  x   /  AST  44<H>  /  ALT  38  /  AlkPhos  318<H>      PT/INR - ( 07 Sep 2022 07:00 )   PT: 11.6 sec;   INR: 0.98          PTT - ( 07 Sep 2022 07:00 )  PTT:30.2 sec  Urinalysis Basic - ( 05 Sep 2022 18:40 )    Color: Yellow / Appearance: Clear / S.020 / pH: x  Gluc: x / Ketone: NEGATIVE  / Bili: Negative / Urobili: 0.2 E.U./dL   Blood: x / Protein: 100 mg/dL / Nitrite: NEGATIVE   Leuk Esterase: NEGATIVE / RBC: 5-10 /HPF / WBC < 5 /HPF   Sq Epi: x / Non Sq Epi: 0-5 /HPF / Bacteria: Present /HPF      CARDIAC MARKERS ( 05 Sep 2022 22:21 )  x     / 0.24 ng/mL / 449 U/L / x     / 4.3 ng/mL  CARDIAC MARKERS ( 05 Sep 2022 16:50 )  x     / 0.29 ng/mL / x     / x     / x              IMAGING/EKG/ETC   O/N Events: Patient with elevated BP overnight, gave lasix. Mildly improved on repeat BP.    Subjective/ROS: Patient seen and examined at bedside. Patient states he is feeling well today. He is amenable to dialysis. He continues to feel distended in his abdomen. No other specific complaints or concerns.    Denies Fever/Chills, HA, CP, SOB, n/v, changes in bowel/urinary habits.  12pt ROS otherwise negative.    VITALS  Vital Signs Last 24 Hrs  T(C): 36.8 (07 Sep 2022 05:14), Max: 36.8 (07 Sep 2022 05:14)  T(F): 98.3 (07 Sep 2022 05:14), Max: 98.3 (07 Sep 2022 05:14)  HR: 75 (07 Sep 2022 05:14) (65 - 77)  BP: 146/88 (07 Sep 2022 05:14) (140/88 - 179/104)  BP(mean): 107 (06 Sep 2022 10:15) (107 - 107)  RR: 19 (07 Sep 2022 05:14) (18 - 19)  SpO2: 97% (07 Sep 2022 05:14) (96% - 100%)    Parameters below as of 07 Sep 2022 05:14  Patient On (Oxygen Delivery Method): room air        CAPILLARY BLOOD GLUCOSE      POCT Blood Glucose.: 148 mg/dL (06 Sep 2022 21:56)  POCT Blood Glucose.: 149 mg/dL (06 Sep 2022 17:05)  POCT Blood Glucose.: 114 mg/dL (06 Sep 2022 11:48)      PHYSICAL EXAM  General: NAD, AAOx3  HEENT: atraumatic, normocephalic  Pulmonary: clear to auscultation bilaterally; No wheeze, or ronchi +rales at the bases, L>R  Cardiovascular: Regular rate and rhythm; no murmurs, rubs or gallops. Normal S1S2  Gastrointestinal: firm, nontender, +distended; bowel sounds present, tympanitic abdomen  Musculoskeletal: 2+ peripheral pulses, no clubbing or cyanosis +Edema b/l LE  Neurology: Pt. alert and oriented x3, fluent speech, able to move all extremities. no asterixis  Skin: no rashes or lesions    MEDICATIONS  (STANDING):  amLODIPine   Tablet 10 milliGRAM(s) Oral every 24 hours  calcitriol   Capsule 0.5 MICROGram(s) Oral daily  calcium acetate 1334 milliGRAM(s) Oral two times a day with meals  dextrose 5%. 1000 milliLiter(s) (100 mL/Hr) IV Continuous <Continuous>  dextrose 5%. 1000 milliLiter(s) (50 mL/Hr) IV Continuous <Continuous>  dextrose 50% Injectable 25 Gram(s) IV Push once  dextrose 50% Injectable 12.5 Gram(s) IV Push once  dextrose 50% Injectable 25 Gram(s) IV Push once  furosemide   Injectable 40 milliGRAM(s) IV Push every 12 hours  glucagon  Injectable 1 milliGRAM(s) IntraMuscular once  influenza   Vaccine 0.5 milliLiter(s) IntraMuscular once  insulin lispro (ADMELOG) corrective regimen sliding scale   SubCutaneous Before meals and at bedtime  polyethylene glycol 3350 17 Gram(s) Oral daily  senna 2 Tablet(s) Oral at bedtime  sodium bicarbonate 650 milliGRAM(s) Oral every 12 hours    MEDICATIONS  (PRN):  acetaminophen     Tablet .. 650 milliGRAM(s) Oral every 6 hours PRN Temp greater or equal to 38C (100.4F), Mild Pain (1 - 3)  aluminum hydroxide/magnesium hydroxide/simethicone Suspension 30 milliLiter(s) Oral every 4 hours PRN Dyspepsia  dextrose Oral Gel 15 Gram(s) Oral once PRN Blood Glucose LESS THAN 70 milliGRAM(s)/deciliter  melatonin 3 milliGRAM(s) Oral at bedtime PRN Insomnia  ondansetron Injectable 4 milliGRAM(s) IV Push every 8 hours PRN Nausea and/or Vomiting      penicillin (Rash)      LABS                        10.4   6.43  )-----------( 107      ( 07 Sep 2022 07:00 )             30.6         139  |  105  |  100<H>  ----------------------------<  93  4.8   |  16<L>  |  13.19<H>    Ca    6.7<L>      07 Sep 2022 07:00  Phos  8.1       Mg     2.3         TPro  6.4  /  Alb  2.7<L>  /  TBili  0.5  /  DBili  x   /  AST  44<H>  /  ALT  38  /  AlkPhos  318<H>      PT/INR - ( 07 Sep 2022 07:00 )   PT: 11.6 sec;   INR: 0.98          PTT - ( 07 Sep 2022 07:00 )  PTT:30.2 sec  Urinalysis Basic - ( 05 Sep 2022 18:40 )    Color: Yellow / Appearance: Clear / S.020 / pH: x  Gluc: x / Ketone: NEGATIVE  / Bili: Negative / Urobili: 0.2 E.U./dL   Blood: x / Protein: 100 mg/dL / Nitrite: NEGATIVE   Leuk Esterase: NEGATIVE / RBC: 5-10 /HPF / WBC < 5 /HPF   Sq Epi: x / Non Sq Epi: 0-5 /HPF / Bacteria: Present /HPF      CARDIAC MARKERS ( 05 Sep 2022 22:21 )  x     / 0.24 ng/mL / 449 U/L / x     / 4.3 ng/mL  CARDIAC MARKERS ( 05 Sep 2022 16:50 )  x     / 0.29 ng/mL / x     / x     / x              IMAGING/EKG/ETC

## 2022-09-08 ENCOUNTER — TRANSCRIPTION ENCOUNTER (OUTPATIENT)
Age: 57
End: 2022-09-08

## 2022-09-08 PROBLEM — N18.9 CHRONIC KIDNEY DISEASE, UNSPECIFIED: Chronic | Status: ACTIVE | Noted: 2022-09-05

## 2022-09-08 PROBLEM — I10 ESSENTIAL (PRIMARY) HYPERTENSION: Chronic | Status: ACTIVE | Noted: 2022-09-05

## 2022-09-08 PROBLEM — E11.9 TYPE 2 DIABETES MELLITUS WITHOUT COMPLICATIONS: Chronic | Status: ACTIVE | Noted: 2022-09-05

## 2022-09-08 LAB
ANION GAP SERPL CALC-SCNC: 15 MMOL/L — SIGNIFICANT CHANGE UP (ref 5–17)
BUN SERPL-MCNC: 86 MG/DL — HIGH (ref 7–23)
C3 SERPL-MCNC: 74 MG/DL — LOW (ref 81–157)
C4 SERPL-MCNC: 43 MG/DL — HIGH (ref 13–39)
CALCIUM SERPL-MCNC: 6.6 MG/DL — LOW (ref 8.4–10.5)
CHLORIDE SERPL-SCNC: 102 MMOL/L — SIGNIFICANT CHANGE UP (ref 96–108)
CO2 SERPL-SCNC: 20 MMOL/L — LOW (ref 22–31)
CREAT SERPL-MCNC: 10.93 MG/DL — HIGH (ref 0.5–1.3)
EGFR: 5 ML/MIN/1.73M2 — LOW
GAMMA INTERFERON BACKGROUND BLD IA-ACNC: 0.05 IU/ML — SIGNIFICANT CHANGE UP
GLUCOSE BLDC GLUCOMTR-MCNC: 116 MG/DL — HIGH (ref 70–99)
GLUCOSE BLDC GLUCOMTR-MCNC: 120 MG/DL — HIGH (ref 70–99)
GLUCOSE BLDC GLUCOMTR-MCNC: 142 MG/DL — HIGH (ref 70–99)
GLUCOSE BLDC GLUCOMTR-MCNC: 168 MG/DL — HIGH (ref 70–99)
GLUCOSE SERPL-MCNC: 92 MG/DL — SIGNIFICANT CHANGE UP (ref 70–99)
HBV DNA # SERPL NAA+PROBE: SIGNIFICANT CHANGE UP
HBV DNA SERPL NAA+PROBE-LOG#: SIGNIFICANT CHANGE UP LOGIU/ML
HCT VFR BLD CALC: 29.7 % — LOW (ref 39–50)
HGB BLD-MCNC: 9.8 G/DL — LOW (ref 13–17)
M TB IFN-G BLD-IMP: NEGATIVE — SIGNIFICANT CHANGE UP
M TB IFN-G CD4+ BCKGRND COR BLD-ACNC: 0.25 IU/ML — SIGNIFICANT CHANGE UP
M TB IFN-G CD4+CD8+ BCKGRND COR BLD-ACNC: 0.26 IU/ML — SIGNIFICANT CHANGE UP
MAGNESIUM SERPL-MCNC: 2.1 MG/DL — SIGNIFICANT CHANGE UP (ref 1.6–2.6)
MCHC RBC-ENTMCNC: 28.1 PG — SIGNIFICANT CHANGE UP (ref 27–34)
MCHC RBC-ENTMCNC: 33 GM/DL — SIGNIFICANT CHANGE UP (ref 32–36)
MCV RBC AUTO: 85.1 FL — SIGNIFICANT CHANGE UP (ref 80–100)
NON-GYNECOLOGICAL CYTOLOGY STUDY: SIGNIFICANT CHANGE UP
NRBC # BLD: 0 /100 WBCS — SIGNIFICANT CHANGE UP (ref 0–0)
PHOSPHATE SERPL-MCNC: 6.2 MG/DL — HIGH (ref 2.5–4.5)
PLATELET # BLD AUTO: 77 K/UL — LOW (ref 150–400)
POTASSIUM SERPL-MCNC: 4.6 MMOL/L — SIGNIFICANT CHANGE UP (ref 3.5–5.3)
POTASSIUM SERPL-SCNC: 4.6 MMOL/L — SIGNIFICANT CHANGE UP (ref 3.5–5.3)
QUANT TB PLUS MITOGEN MINUS NIL: 8.65 IU/ML — SIGNIFICANT CHANGE UP
RBC # BLD: 3.49 M/UL — LOW (ref 4.2–5.8)
RBC # FLD: 13.2 % — SIGNIFICANT CHANGE UP (ref 10.3–14.5)
SODIUM SERPL-SCNC: 137 MMOL/L — SIGNIFICANT CHANGE UP (ref 135–145)
WBC # BLD: 7.45 K/UL — SIGNIFICANT CHANGE UP (ref 3.8–10.5)
WBC # FLD AUTO: 7.45 K/UL — SIGNIFICANT CHANGE UP (ref 3.8–10.5)

## 2022-09-08 PROCEDURE — 93306 TTE W/DOPPLER COMPLETE: CPT | Mod: 26

## 2022-09-08 PROCEDURE — 90937 HEMODIALYSIS REPEATED EVAL: CPT

## 2022-09-08 PROCEDURE — 99232 SBSQ HOSP IP/OBS MODERATE 35: CPT | Mod: GC

## 2022-09-08 RX ORDER — CALCIUM ACETATE 667 MG
1 TABLET ORAL
Qty: 90 | Refills: 0
Start: 2022-09-08 | End: 2022-10-08

## 2022-09-08 RX ORDER — CALCIUM ACETATE 667 MG
1 TABLET ORAL
Qty: 90 | Refills: 0
Start: 2022-09-08 | End: 2022-10-07

## 2022-09-08 RX ADMIN — CALCITRIOL 0.5 MICROGRAM(S): 0.5 CAPSULE ORAL at 15:20

## 2022-09-08 RX ADMIN — LOSARTAN POTASSIUM 50 MILLIGRAM(S): 100 TABLET, FILM COATED ORAL at 15:11

## 2022-09-08 RX ADMIN — Medication 650 MILLIGRAM(S): at 07:08

## 2022-09-08 RX ADMIN — Medication 2: at 18:03

## 2022-09-08 RX ADMIN — Medication 1334 MILLIGRAM(S): at 18:02

## 2022-09-08 RX ADMIN — HEPARIN SODIUM 5000 UNIT(S): 5000 INJECTION INTRAVENOUS; SUBCUTANEOUS at 22:46

## 2022-09-08 RX ADMIN — HEPARIN SODIUM 5000 UNIT(S): 5000 INJECTION INTRAVENOUS; SUBCUTANEOUS at 06:26

## 2022-09-08 RX ADMIN — Medication 1334 MILLIGRAM(S): at 08:54

## 2022-09-08 RX ADMIN — HEPARIN SODIUM 5000 UNIT(S): 5000 INJECTION INTRAVENOUS; SUBCUTANEOUS at 15:10

## 2022-09-08 NOTE — PROGRESS NOTE ADULT - PROBLEM SELECTOR PLAN 2
Patient with new onset ascites present on both CT and US imaging. Liver cirrhosis also present on imaging, likel 2/2 hx of Hep C. Patient self-reported completing HepC treatment, however remains HepC positive. Exact antiviral and completion of HepC treatment is unclear as it is typically extremely effective.     - GI following, f/u recs. HBV DNA ordered.  - Underwent diagnostic paracentesis today with IR - peritoneal fluid studies consistent with cirrhosis Patient with new onset ascites present on both CT and US imaging. Liver cirrhosis also present on imaging, likely 2/2 hx of Hep C. Patient self-reported completing HepC treatment, however remains HepC positive. Exact antiviral and completion of HepC treatment is unclear as it is typically extremely effective.     - GI following, f/u recs. HBV DNA ordered.  - Underwent diagnostic paracentesis today with IR - peritoneal fluid studies consistent with cirrhosis  - Will require GI followup as outpatient

## 2022-09-08 NOTE — PROGRESS NOTE ADULT - ATTENDING COMMENTS
seen on f/u on HD -- agree with above  lowering UF goal   may need paracentesis as not tolerating much UF

## 2022-09-08 NOTE — PROGRESS NOTE ADULT - PROBLEM SELECTOR PLAN 1
Cr 13.27 (baseline Cr 10). Follows outpatient w/ nephrologist Dr. Almanza. Likely progressed from CKD to ESRD. Underwent HD catheter placement with IR today, along with first session of HD.    - Nephrology following, f/u recs  - f/u pth, vit d. Hb not at goal, f/u iron panel  - phoslo bid w/ meals  - adjust medication dosages for level of renal function  - strict I/Os and daily weights  - vascular consulted for AVF vein mapping and outpatient AVF formation Cr 13.27 (baseline Cr 10). Follows outpatient w/ nephrologist Dr. Almanza. Likely progressed from CKD to ESRD. With nausea and one episode of emesis with HD today.    - Nephrology following, f/u recs  - phoslo bid w/ meals  - adjust medication dosages for level of renal function  - strict I/Os and daily weights  - vascular determined no need for vein mapping as inpatient

## 2022-09-08 NOTE — PROGRESS NOTE ADULT - SUBJECTIVE AND OBJECTIVE BOX
O/N Events: Avitia removed yesterday evening, passed TOV    Subjective/ROS: Patient seen and examined at bedside. Patient feeling well this morning. Complaints of pain in side abdomen at site where paracentesis was performed. Feels his abdomen is less distended than yesterday.    Denies Fever/Chills, HA, CP, SOB, n/v, changes in bowel/urinary habits.  12pt ROS otherwise negative.    VITALS  Vital Signs Last 24 Hrs  T(C): 36.7 (08 Sep 2022 14:46), Max: 36.9 (07 Sep 2022 21:40)  T(F): 98 (08 Sep 2022 14:46), Max: 98.4 (07 Sep 2022 21:40)  HR: 75 (08 Sep 2022 14:46) (74 - 82)  BP: 171/109 (08 Sep 2022 14:46) (129/82 - 171/109)  BP(mean): --  RR: 18 (08 Sep 2022 14:46) (18 - 18)  SpO2: 97% (08 Sep 2022 14:46) (94% - 97%)    Parameters below as of 08 Sep 2022 14:46  Patient On (Oxygen Delivery Method): room air      CAPILLARY BLOOD GLUCOSE      POCT Blood Glucose.: 116 mg/dL (08 Sep 2022 14:04)  POCT Blood Glucose.: 120 mg/dL (08 Sep 2022 08:01)  POCT Blood Glucose.: 133 mg/dL (07 Sep 2022 22:10)      PHYSICAL EXAM  General: NAD, AAOx3  HEENT: atraumatic, normocephalic  Pulmonary: clear to auscultation bilaterally; No wheeze, or ronchi +rales at the bases, L>R  Cardiovascular: Regular rate and rhythm; no murmurs, rubs or gallops. Normal S1S2  Gastrointestinal: firm, nontender, +distended; bowel sounds present, tympanitic abdomen  Musculoskeletal: 2+ peripheral pulses, no clubbing or cyanosis +Edema b/l LE  Neurology: Pt. alert and oriented x3, fluent speech, able to move all extremities. no asterixis  Skin: no rashes or lesions    MEDICATIONS  (STANDING):  calcitriol   Capsule 0.5 MICROGram(s) Oral daily  calcium acetate 1334 milliGRAM(s) Oral three times a day with meals  dextrose 5%. 1000 milliLiter(s) (100 mL/Hr) IV Continuous <Continuous>  dextrose 5%. 1000 milliLiter(s) (50 mL/Hr) IV Continuous <Continuous>  dextrose 50% Injectable 25 Gram(s) IV Push once  dextrose 50% Injectable 12.5 Gram(s) IV Push once  dextrose 50% Injectable 25 Gram(s) IV Push once  glucagon  Injectable 1 milliGRAM(s) IntraMuscular once  heparin   Injectable 5000 Unit(s) SubCutaneous every 8 hours  influenza   Vaccine 0.5 milliLiter(s) IntraMuscular once  insulin lispro (ADMELOG) corrective regimen sliding scale   SubCutaneous Before meals and at bedtime  losartan 50 milliGRAM(s) Oral every 24 hours  polyethylene glycol 3350 17 Gram(s) Oral daily  senna 2 Tablet(s) Oral at bedtime    MEDICATIONS  (PRN):  acetaminophen     Tablet .. 650 milliGRAM(s) Oral every 6 hours PRN Temp greater or equal to 38C (100.4F), Mild Pain (1 - 3)  dextrose Oral Gel 15 Gram(s) Oral once PRN Blood Glucose LESS THAN 70 milliGRAM(s)/deciliter  melatonin 3 milliGRAM(s) Oral at bedtime PRN Insomnia  ondansetron Injectable 4 milliGRAM(s) IV Push every 8 hours PRN Nausea and/or Vomiting      penicillin (Rash)      LABS                        9.8    7.45  )-----------( 77       ( 08 Sep 2022 08:50 )             29.7     09-08    137  |  102  |  86<H>  ----------------------------<  92  4.6   |  20<L>  |  10.93<H>    Ca    6.6<L>      08 Sep 2022 08:50  Phos  6.2     09-08  Mg     2.1     09-08    TPro  6.4  /  Alb  2.7<L>  /  TBili  0.5  /  DBili  x   /  AST  44<H>  /  ALT  38  /  AlkPhos  318<H>  09-07    PT/INR - ( 07 Sep 2022 07:00 )   PT: 11.6 sec;   INR: 0.98          PTT - ( 07 Sep 2022 07:00 )  PTT:30.2 sec            IMAGING/EKG/ETC

## 2022-09-08 NOTE — DISCHARGE NOTE PROVIDER - CARE PROVIDER_API CALL
Jovanni Reno)  Vascular Surgery  [address]  [place] [address]  Phone: [phone]  Fax: [phone]  Established Patient  Scheduled Appointment: 09/19/2022 09:00 AM    Donna Almanza)  Internal Medicine  [address], [address]  [place] [address]  Phone: [phone]  Fax: [phone]  Established Patient  Follow Up Time: 1 week   Jovanni Reno)  Vascular Surgery  [address] [place] [address]  Phone: [phone]  Fax: [phone]  Established Patient  Scheduled Appointment: 09/19/2022 09:00 AM    Donna Almanza)  Internal Medicine  [address], [address]  [place] [address]  Phone: [phone]  Fax: [phone]  Established Patient  Scheduled Appointment: 09/15/2022 11:00 AM    Ean Prajapati)  Gastroenterology; Transplant Hepatology  [address] [place] [address]  Phone: [phone]  Fax: [phone]  Follow Up Time: 2 weeks   Jovanni Reno)  Vascular Surgery  [address] [place] [address]  Phone: [phone]  Fax: [phone]  Established Patient  Scheduled Appointment: 09/19/2022 09:00 AM    Donna Almanza)  Internal Medicine  [address], [address]  [place] [address]  Phone: [phone]  Fax: [phone]  Established Patient  Scheduled Appointment: 09/15/2022 11:00 AM    Ean Prajapati)  Gastroenterology; Transplant Hepatology  [address] [place] [address]  Phone: [phone]  Fax: [phone]  Follow Up Time: 2 weeks    Tomy López)  Cardiology; Internal Medicine; Interventional Cardiology  [address] [address]  Phone: [phone]  Fax: [phone]  Scheduled Appointment: 09/20/2022 12:20 PM

## 2022-09-08 NOTE — DISCHARGE NOTE PROVIDER - NSDCFUADDAPPT_GEN_ALL_CORE_FT
(1) Please schedule a follow-up with your kidney doctor Dr. Donna Almanza as soon as possible. (1) Please schedule a follow-up with your kidney doctor Dr. Donna Almanza as soon as possible.    (2) The GI department will schedule a followup with you with Dr. Ean Prajapati, hepatologist, for followup. Please call [phone] if you do not hear from them within 2 weeks. (1) The GI department will schedule a followup with you with Dr. Ean Prajapati, hepatologist, for followup. Please call [phone] if you do not hear from them within 2 weeks. (1) The GI department will schedule a followup with you with Dr. Ean Prajapati, hepatologist, for followup. Please call [phone] if you do not hear from them within 2 weeks.    (2) Please review your echocardiogram with your PCP and discuss possible referral to cardiology. (1) The GI department will schedule a followup with you with Dr. Ean Prajapati, hepatologist, for followup. Please call [phone] if you do not hear from them within 2 weeks.    (2) Please review your echocardiogram with your PCP and discuss possible referral to cardiology.    Please bring your Insurance card, Photo ID and Discharge paperwork to the following appointment:    (3) Please follow up with your Cardiology Provider, Dr. Tomy López at [address] [place] [address] on 09/20/2022 at 12:20pm.    Appointment was scheduled by Ms. GUERLINE Barry, Referral Coordinator.

## 2022-09-08 NOTE — PROGRESS NOTE ADULT - ATTENDING COMMENTS
second HD today for clearance and gentle UF   may need paracentesis for ascites as HD unlikely to be able to remove so well second HD today for clearance and gentle UF   may need further paracentesis for ascites as HD unlikely to be able to remove so much and still very distended abd

## 2022-09-08 NOTE — DISCHARGE NOTE PROVIDER - NPI NUMBER (FOR SYSADMIN USE ONLY) :
[[id number]],[[id number]] [[id number]],[[id number]],[[id number]] [[id number]],[[id number]],[[id number]],[[id number]]

## 2022-09-08 NOTE — PROGRESS NOTE ADULT - SUBJECTIVE AND OBJECTIVE BOX
Physical Medicine and Rehabilitation Progress Note :     Patient is a 57y old  Male who presents with a chief complaint of Hypertensive urgency (08 Sep 2022 10:59)      HPI:  58 yo M with HTN, DM, CKD, HCV presented w/ sob, abdominal distension, and LE edema. Over the past 2 weeks he has noticed his abdomen expanding and increased LE edema. Over the past week he has become more dyspneic with exertion. Four days prior to admission, patient also fell on his left side as a result of the increased edema. He follows outpatient nephrology with Dr. Almanza as outpatient. He has discussed dialysis with his nephrologist, but was told he did not require dialysis on his last vitis. Patient also reports history of HCV. He reportedly was treated in the past. He follows with GI for HCV, and per patient was still positive for HCV on his last visit 2-3 months prior. Patient reports he is not always compliant with home medications. Denies fevers, headaches, change in vision, chest pain, n/v/d.    ED Course:   VITAL SIGNS: Last 24 Hours  T(F): 98.5 (05 Sep 2022 15:49), Max: 98.5 (05 Sep 2022 15:49)  HR: 72 (05 Sep 2022 19:45) (72 - 80)  BP: 130/87 (05 Sep 2022 19:45) (130/87 - 177/116)  RR: 18 (05 Sep 2022 19:45) (18 - 20)  SpO2: 97% (05 Sep 2022 19:45) (94% - 97%)    Labs:  WBC 6.95, Hgb 9.8, Plt 127  Na 144, K 4.5, BUN 97, Cr 13.27, Alk Phos 345, Trop 0.29, BNP 30,151  EKG: NSR, PVC, no ST deviations    Imaging:  CXR: No consolidations or signs of PNX    Interventions:   Amlodipine 5mg, Coreg 12.5, lasix 40mg, losartan 100mg   (05 Sep 2022 19:50)                            9.8    7.45  )-----------( 77       ( 08 Sep 2022 08:50 )             29.7       09-08    137  |  102  |  86<H>  ----------------------------<  92  4.6   |  20<L>  |  10.93<H>    Ca    6.6<L>      08 Sep 2022 08:50  Phos  6.2     09-08  Mg     2.1     09-08    TPro  6.4  /  Alb  2.7<L>  /  TBili  0.5  /  DBili  x   /  AST  44<H>  /  ALT  38  /  AlkPhos  318<H>  09-07    Vital Signs Last 24 Hrs  T(C): 36.6 (08 Sep 2022 11:45), Max: 36.9 (07 Sep 2022 21:40)  T(F): 97.8 (08 Sep 2022 11:45), Max: 98.4 (07 Sep 2022 21:40)  HR: 79 (08 Sep 2022 11:45) (74 - 82)  BP: 135/94 (08 Sep 2022 11:45) (129/82 - 158/82)  BP(mean): --  RR: 18 (08 Sep 2022 11:45) (18 - 18)  SpO2: 94% (08 Sep 2022 11:45) (94% - 100%)    Parameters below as of 08 Sep 2022 11:45  Patient On (Oxygen Delivery Method): room air        MEDICATIONS  (STANDING):  calcitriol   Capsule 0.5 MICROGram(s) Oral daily  calcium acetate 1334 milliGRAM(s) Oral three times a day with meals  dextrose 5%. 1000 milliLiter(s) (100 mL/Hr) IV Continuous <Continuous>  dextrose 5%. 1000 milliLiter(s) (50 mL/Hr) IV Continuous <Continuous>  dextrose 50% Injectable 25 Gram(s) IV Push once  dextrose 50% Injectable 12.5 Gram(s) IV Push once  dextrose 50% Injectable 25 Gram(s) IV Push once  glucagon  Injectable 1 milliGRAM(s) IntraMuscular once  heparin   Injectable 5000 Unit(s) SubCutaneous every 8 hours  influenza   Vaccine 0.5 milliLiter(s) IntraMuscular once  insulin lispro (ADMELOG) corrective regimen sliding scale   SubCutaneous Before meals and at bedtime  losartan 50 milliGRAM(s) Oral every 24 hours  polyethylene glycol 3350 17 Gram(s) Oral daily  senna 2 Tablet(s) Oral at bedtime    MEDICATIONS  (PRN):  acetaminophen     Tablet .. 650 milliGRAM(s) Oral every 6 hours PRN Temp greater or equal to 38C (100.4F), Mild Pain (1 - 3)  dextrose Oral Gel 15 Gram(s) Oral once PRN Blood Glucose LESS THAN 70 milliGRAM(s)/deciliter  melatonin 3 milliGRAM(s) Oral at bedtime PRN Insomnia  ondansetron Injectable 4 milliGRAM(s) IV Push every 8 hours PRN Nausea and/or Vomiting    Currently Undergoing Physical/ Occupational Therapy at bedside    Initial Physical / Occupational Therapy Functional Status Assessment :       Previous Level of Function:     · Ambulation Skills	independent  · Transfer Skills	independent  · ADL Skills	independent  · Work/Leisure Activity	independent    Cognitive Status Examination:   · Orientation	oriented to person, place, time and situation  · Level of Consciousness	alert  · Follows Commands and Answers Questions	100% of the time  · Personal Safety and Judgment	intact  · Short Term Memory	intact  · Long Term Memory	intact    Range of Motion Exam:   · Range of Motion Examination	no ROM deficits were identified    Manual Muscle Testing:   · Manual Muscle Testing Results	>3+/5    Bed Mobility: Rolling/Turning:     · Level of [place]	independent    Bed Mobility: Scooting/Bridging:     · Level of [place]	independent    Bed Mobility: Sit to Supine:     · Level of [place]	independent    Bed Mobility: Supine to Sit:     · Level of [place]	independent    Transfer: Sit to Stand:     · Level of [place]	independent    Transfer: Stand to Sit:     · Level of [place]	independent    Sit/Stand Transfer Safety Analysis:     · Transfer Safety Concerns Noted	steady, no loss of balance    Gait Skills:     · Level of [place]	independent  · Gait Distance	200 feet    Gait Analysis:     · Gait Deviations Noted	steady, no loss of balance, no ARAUJO observed    Stair Negotiation:     · Level of [place]	patient declined assessment    Balance Skills Assessment:     · Sitting Balance: Static	good balance  · Sitting Balance: Dynamic	good minus  · Sit-to-Stand Balance	fair plus  · Standing Balance: Static	good minus  · Standing Balance: Dynamic	fair plus    Sensory Examination:   Sensory Examination:    Grossly Intact:   · Gross Sensory Examination	Grossly Intact        PM&R Impression : as above    Current Disposition Plan Recommendations :    d/c home with no post discharge rehab needs

## 2022-09-08 NOTE — PROGRESS NOTE ADULT - SUBJECTIVE AND OBJECTIVE BOX
Patient was seen and evaluated on dialysis.   /80    Dialyzer: Optiflux [id number]  QB: 250 mL/min  QD: 500 mL/min  K bath: 3  Goal UF: 1.5L  Duration: 150 min    Patient had an episode of emesis while on HD- however now tolerating procedure  Continue full hemodialysis treatment as prescribed.

## 2022-09-08 NOTE — DISCHARGE NOTE PROVIDER - NSDCMRMEDTOKEN_GEN_ALL_CORE_FT
calcitriol 0.25 mcg oral capsule: 1 cap(s) orally once a day  hydroCHLOROthiazide 12.5 mg oral capsule: 1 cap(s) orally once a day  losartan 50 mg oral tablet: 1 tab(s) orally once a day   calcitriol 0.25 mcg oral capsule: 1 cap(s) orally once a day  calcium acetate 667 mg oral tablet: 1 tab(s) orally 3 times a day with meals  hydroCHLOROthiazide 12.5 mg oral capsule: 1 cap(s) orally once a day  losartan 50 mg oral tablet: 1 tab(s) orally once a day   calcitriol 0.25 mcg oral capsule: 1 cap(s) orally once a day  calcium acetate 667 mg oral tablet: 1 tab(s) orally 3 times a day with meals  hydroCHLOROthiazide 12.5 mg oral capsule: 1 cap(s) orally once a day  losartan 50 mg oral tablet: 1 tab(s) orally once a day  metoprolol succinate 25 mg oral tablet, extended release: 1 tab(s) orally every 24 hours

## 2022-09-08 NOTE — PROGRESS NOTE ADULT - PROBLEM SELECTOR PLAN 6
Pt. with history of Hep C. He was reportedly treated as outpatient, but was still positive 2-3 months prior to admission. If pt. has cirrhotic findings on US, may be contributing to abdominal distention.     - f/u hep panel  - f/u liver US  - f/u GI recs  - f/u CT abdomen/pelvis    #distended abdomen, tympanitic on physical exam  -abd x-ray no sig findings Pt. with history of Hep C. He was reportedly treated as outpatient, but was still positive 2-3 months prior to admission. Pt with evidence of cirrhosis on imaging. Will require GI followup as outpatient.

## 2022-09-08 NOTE — PROGRESS NOTE ADULT - PROBLEM SELECTOR PLAN 4
Pt. with anion gap of 20 on presentation. Likely secondary to elevated BUN in the setting of CKD. Expect improvement with HD    - ok to stop bicarb Pt. with anion gap of 20 on presentation. Likely secondary to elevated BUN in the setting of CKD. Expect improvement with HD. Off bicarb.

## 2022-09-08 NOTE — DISCHARGE NOTE PROVIDER - PROVIDER TOKENS
PROVIDER:[TOKEN:[[id number]],SCHEDULEDAPPT:[09/19/2022],SCHEDULEDAPPTTIME:[09:00 AM],ESTABLISHEDPATIENT:[T]],PROVIDER:[TOKEN:[[id number]],FOLLOWUP:[1 week],ESTABLISHEDPATIENT:[T]] PROVIDER:[TOKEN:[[id number]],SCHEDULEDAPPT:[09/19/2022],SCHEDULEDAPPTTIME:[09:00 AM],ESTABLISHEDPATIENT:[T]],PROVIDER:[TOKEN:[[id number]],SCHEDULEDAPPT:[09/15/2022],SCHEDULEDAPPTTIME:[11:00 AM],ESTABLISHEDPATIENT:[T]],PROVIDER:[TOKEN:[[id number]],FOLLOWUP:[2 weeks]] PROVIDER:[TOKEN:[[id number]],SCHEDULEDAPPT:[09/19/2022],SCHEDULEDAPPTTIME:[09:00 AM],ESTABLISHEDPATIENT:[T]],PROVIDER:[TOKEN:[[id number]],SCHEDULEDAPPT:[09/15/2022],SCHEDULEDAPPTTIME:[11:00 AM],ESTABLISHEDPATIENT:[T]],PROVIDER:[TOKEN:[[id number]],FOLLOWUP:[2 weeks]],PROVIDER:[TOKEN:[[id number]],SCHEDULEDAPPT:[09/20/2022],SCHEDULEDAPPTTIME:[12:20 PM]]

## 2022-09-08 NOTE — DISCHARGE NOTE PROVIDER - ATTENDING DISCHARGE PHYSICAL EXAMINATION:
Patient seen and examined on day of discharge. 58 y/o male w/ HIV and history of CKD admitted for hypertensive urgency, found to have cirrhosis and now likely ESRD. During this admission he has had PermCath placement and initiation of hemodialysis with improvement in his systolic blood pressures. He was seen and evaluated by the vascular service for AVF mapping and will follow up outpatient for creation. He has had thrombocytopenia which is likely multifactorial in the context of his ESRD, cirrhosis and HIV. Platelet counts have been stable and there have been no bleeding complications during his procedures. He will be following up outpatient with the nephrology, vascular and GI services for further evaluation and management. The plan was discussed with the patient who is in agreement and verbalized understanding. Return precautions were provided to the patient.   PHYSICAL EXAM:      Constitutional: NAD, well-groomed, well-developed  HEENT: PERRLA, EOMI, Normal Hearing, MMM.   Neck: No LAD, No JVD. permcath dressing c/d/i   Back: Normal spine flexure, No CVA tenderness  Respiratory: decreased breath sounds at the bases, mild crackles b/l.   Cardiovascular: S1 and S2, RRR, no M/G/R  Gastrointestinal: BS+, soft, distended. tympanitic.   Extremities: No peripheral edema  Vascular: 2+ peripheral pulses  Neurological: A/O x 3, no focal deficits  Psychiatric: Normal mood, normal affect  Musculoskeletal: 5/5 strength b/l upper and lower extremities  Skin: No rashes

## 2022-09-08 NOTE — DISCHARGE NOTE PROVIDER - CARE PROVIDERS DIRECT ADDRESSES
,[email or web address],DirectAddress_Unknown ,[email or web address],DirectAddress_Unknown,DirectAddress_Unknown ,[email or web address],DirectAddress_Unknown,DirectAddress_Unknown,DirectAddress_Unknown

## 2022-09-08 NOTE — PROGRESS NOTE ADULT - ASSESSMENT
58 yo M with HTN, DM, CKD (baseline Cr. ~10) , HCV, COVID presented to the ED w/ SOB and LE found to have oliguric renal failure w/ HD initiation on 9/8.    #CKD progressed to ESRD   Baseline Cr ~ 10   56 yo M with HTN, DM, CKD (baseline Cr. ~10) , HCV, COVID presented to the ED w/ SOB and LE found to have oliguric renal failure w/ HD initiation on 9/8.    #CKD progressed to ESRD   Baseline Cr ~ 10  progressive CKD w/ uremic symptoms  HD #2 today for UF and clearance with a goal of 1.5L off   will need outpatient SW HD set up  TB and quanteferon    #MBD  Calcium 6.7  Phosphorus 8.1    c/w Calcium acetate 667 2TABS TID with meals   c/w Calcitriol .5mcg PO daily   Low phos diet along with renal diet     #Anemia:  Hgb 10.4  at goal   no need for EPO w/ HD      #HTN:  UF with HD   Continue with home antihypertensives    Jennifer Champagne D.O  PGY 5 nephrology fellow  [phone]

## 2022-09-08 NOTE — DISCHARGE NOTE PROVIDER - NSDCCPCAREPLAN_GEN_ALL_CORE_FT
PRINCIPAL DISCHARGE DIAGNOSIS  Diagnosis: ESRD (end stage renal disease)  Assessment and Plan of Treatment: ESRD is a longstanding disease of the kidneys leading to renal failure. The kidneys filter waste and excess fluid from the blood. As kidneys fail, waste builds up. Symptoms develop slowly and aren't specific to the disease. Some people have no symptoms at all and are diagnosed by a lab test. Medications help manage symptoms. In later stages, filtering the blood with a machine (dialysis) or a transplant may be needed. Please continue to get dialysis on your scheduled days and follow up with a nephrologist and your PCP in 101-4 days.        SECONDARY DISCHARGE DIAGNOSES  Diagnosis: Hypertension  Assessment and Plan of Treatment: You have a known history of high blood pressure prior to your admission. To manage this you are on a medication called losartan 50mg once a day as well has hydrochlorothiazide 25mg once a day. High blood pressure can cause damage to your heart and kidneys and increases your risk of heart attack and stroke. To avoid this, It is important that you continue to take this medication when you are discharged so that you can continue to control your blood pressure. Additionally be sure to follow up with your primary care physician on a regular basis to make sure your blood pressure continues to be well controlled. If you experience symptoms such as but not limited to: sudden onset blurry vision, nausea, vomiting, chest pain, shortness of breath, or palpitations, please go to the nearest emergency room.       PRINCIPAL DISCHARGE DIAGNOSIS  Diagnosis: ESRD (end stage renal disease)  Assessment and Plan of Treatment: ESRD is a longstanding disease of the kidneys leading to renal failure. The kidneys filter waste and excess fluid from the blood. As kidneys fail, waste builds up. Symptoms develop slowly and aren't specific to the disease. Some people have no symptoms at all and are diagnosed by a lab test. Medications help manage symptoms. In later stages, filtering the blood with a machine (dialysis) or a transplant may be needed. Please continue to get dialysis on your scheduled days and follow up with a nephrologist and your PCP in 10-14days.        SECONDARY DISCHARGE DIAGNOSES  Diagnosis: Hypertension  Assessment and Plan of Treatment: You have a known history of high blood pressure prior to your admission. To manage this you are on a medication called losartan 50mg once a day as well has hydrochlorothiazide 25mg once a day. High blood pressure can cause damage to your heart and kidneys and increases your risk of heart attack and stroke. To avoid this, It is important that you continue to take this medication when you are discharged so that you can continue to control your blood pressure. Additionally be sure to follow up with your primary care physician on a regular basis to make sure your blood pressure continues to be well controlled. If you experience symptoms such as but not limited to: sudden onset blurry vision, nausea, vomiting, chest pain, shortness of breath, or palpitations, please go to the nearest emergency room.

## 2022-09-08 NOTE — PROGRESS NOTE ADULT - ATTENDING COMMENTS
Patient was seen and examined at bedside on 9/8/2022 at 1230 pm. Patient has no acute complaints - feels improved. Denies SOB, CP. ROS is otherwise negative. Vitals, labwork and pertinent imaging reviewed - worsening thrombocytopenia. Physical exam - NAD, AAO x 4, PERRLA, EOMI, MMM, supple neck, chest - CTA b/l, CV - rrr, s1s2, no m/r/g, abd - soft, NTND, + BS, ext - wwp, psych - normal affect, skin - no rash    Plan  -Check TTE  -For HD today  -s/p IR w/ diagnostic/therapeutic para - studies c/w Cirrhosis  -Vascular consulted for possible fistula planning, GI on board as well given cirrhosis  -Check platelet blue top, consider heme consult if persistent downtrend although at this time suspect in setting of cirrhosis and ESRD, low suspicion for HIT

## 2022-09-08 NOTE — PROGRESS NOTE ADULT - PROBLEM SELECTOR PLAN 3
/106 on admission. No AMS or chest pain but patient has elevated Cr (likely 2/2 CKD rather than HTN) and elevated troponin. On home HCTZ 12.5 qd and losartan 50mg qd. Received amlodipine 5mg, coreg 12.5, losartan 100mg, and lasix 40mg IV in ED. Likely 2/2 medication noncompliance vs volume overload i/s/o CKD    - monitor BP  - patient unagreeable to amlodipine due to swelling of legs  - ok to restart home losartan 50mg qd  - f/u ECHO /106 on admission. No AMS or chest pain but patient has elevated Cr (likely 2/2 CKD rather than HTN) and elevated troponin. On home HCTZ 12.5 qd and losartan 50mg qd. Received amlodipine 5mg, coreg 12.5, losartan 100mg, and lasix 40mg IV in ED. Likely 2/2 medication noncompliance vs volume overload i/s/o CKD    - monitor BP  - Will home losartan 50mg qd as needed  - f/u ECHO /106 on admission. No AMS or chest pain but patient has elevated Cr (likely 2/2 CKD rather than HTN) and elevated troponin. On home HCTZ 12.5 qd and losartan 50mg qd. Received amlodipine 5mg, coreg 12.5, losartan 100mg, and lasix 40mg IV in ED. Likely 2/2 medication noncompliance vs volume overload i/s/o CKD    - monitor BP  - Will restart home losartan 50mg qd as needed  - echo with hyperdynamic LV with EF of >75%, grade 1 diastolic dysfunction  - will require followup with cardiology as outpatient

## 2022-09-08 NOTE — PROGRESS NOTE ADULT - SUBJECTIVE AND OBJECTIVE BOX
Patient is a 57y Male seen and evaluated at bedside. no acute events overnight patient tolerated first HD well yesterday for HD #2 today /82 hgb 10.4 K 4.8 bicab 16 uop 300 cc      Meds:    acetaminophen     Tablet .. 650 every 6 hours PRN  calcitriol   Capsule 0.5 daily  calcium acetate 1334 three times a day with meals  dextrose 5%. 1000 <Continuous>  dextrose 5%. 1000 <Continuous>  dextrose 50% Injectable 25 once  dextrose 50% Injectable 12.5 once  dextrose 50% Injectable 25 once  dextrose Oral Gel 15 once PRN  glucagon  Injectable 1 once  heparin   Injectable 5000 every 8 hours  influenza   Vaccine 0.5 once  insulin lispro (ADMELOG) corrective regimen sliding scale  Before meals and at bedtime  losartan 50 every 24 hours  melatonin 3 at bedtime PRN  ondansetron Injectable 4 every 8 hours PRN  polyethylene glycol 3350 17 daily  senna 2 at bedtime      T(C): , Max: 36.9 (22 @ 21:40)  T(F): , Max: 98.4 (22 @ 21:40)  HR: 74 (22 @ 05:35)  BP: 129/82 (22 @ 05:35)  BP(mean): --  RR: 18 (22 @ 05:35)  SpO2: 96% (22 @ 05:35)  Wt(kg): --     @ 07:01  -   @ 07:00  --------------------------------------------------------  IN: 400 mL / OUT: 1800 mL / NET: -1400 mL          Review of Systems:  all other ROS negative       PHYSICAL EXAM:  GENERAL: NAD resting comfortably   CHEST/LUNG: CTA no accessory muscle use  HEART: normal S1S2, RRR  ABDOMEN: Soft, Nontender, +BS,   EXTREMITIES: No clubbing, cyanosis, or edema   ACCESS: +TDC      LABS:                        10.4   6.43  )-----------( 107      ( 07 Sep 2022 07:00 )             30.6         139  |  105  |  100<H>  ----------------------------<  93  4.8   |  16<L>  |  13.19<H>    Ca    6.7<L>      07 Sep 2022 07:00  Phos  8.1       Mg     2.3         TPro  6.4  /  Alb  2.7<L>  /  TBili  0.5  /  DBili  x   /  AST  44<H>  /  ALT  38  /  AlkPhos  318<H>        PT/INR - ( 07 Sep 2022 07:00 )   PT: 11.6 sec;   INR: 0.98          PTT - ( 07 Sep 2022 07:00 )  PTT:30.2 sec          RADIOLOGY & ADDITIONAL STUDIES:        Hemoglobin: 10.4 g/dL (22 @ 07:00)  Phosphorus Level, Serum: 8.1 mg/dL (22 @ 07:00)  Hemoglobin: 9.2 g/dL (22 @ 05:30)  Phosphorus Level, Serum: 7.9 mg/dL (22 @ 05:30)    Hepatitis B Core IgM Antibody: Nonreact (22 @ 07:00)  Albumin, Serum: 2.7 g/dL (22 @ 07:00)    calcium acetate 1334 milliGRAM(s) Oral three times a day with meals, 22 @ 16:47, Routine  calcium acetate 667 milliGRAM(s) Oral two times a day with meals, 22 @ 18:34, Routine  calcium acetate 1334 milliGRAM(s) Oral two times a day with meals, 22 @ 04:35, Routine    Hemodialysis Treatment.:     Schedule: Once, Modality: Hemodialysis, Access: Internal Jugular Central Venous Catheter    Dialyzer: Optiflux [id number], Time: 150 Min    Blood Flow: 250 mL/Min , Dialysate Flow: 500 mL/Min, Dialysate Temp: 36.5, Tubinmm (Adult)    Target Fluid Removal: 1.5 Liters    Dialysate Electrolytes (mEq/L): Potassium 2, Calcium 2.5, Sodium 138, Bicarbonate 35 (22 @ 07:30) [Active]  Hemodialysis Treatment.:     Schedule: Once, Modality: Hemodialysis, Access: Subclavian Central Venous Catheter    Dialyzer: Optiflux [id number], Time: 120 Min    Blood Flow: 200 mL/Min , Dialysate Flow: 500 mL/Min, Dialysate Temp: 36.5, Tubinmm (Adult)    Target Fluid Removal: 1.5 Liters    Dialysate Electrolytes (mEq/L): Potassium 3, Calcium 2.5, Sodium 138, Bicarbonate 35 (22 @ 10:22) [Completed]     Patient is a 57y Male seen and evaluated during hemodialysis no acute events overnight patient tolerated first HD well yesterday for HD #2 today /82 hgb 10.4 K 4.8 bicarb 16 uop 300 cc      Meds:    acetaminophen     Tablet .. 650 every 6 hours PRN  calcitriol   Capsule 0.5 daily  calcium acetate 1334 three times a day with meals  dextrose 5%. 1000 <Continuous>  dextrose 5%. 1000 <Continuous>  dextrose 50% Injectable 25 once  dextrose 50% Injectable 12.5 once  dextrose 50% Injectable 25 once  dextrose Oral Gel 15 once PRN  glucagon  Injectable 1 once  heparin   Injectable 5000 every 8 hours  influenza   Vaccine 0.5 once  insulin lispro (ADMELOG) corrective regimen sliding scale  Before meals and at bedtime  losartan 50 every 24 hours  melatonin 3 at bedtime PRN  ondansetron Injectable 4 every 8 hours PRN  polyethylene glycol 3350 17 daily  senna 2 at bedtime      T(C): , Max: 36.9 (22 @ 21:40)  T(F): , Max: 98.4 (22 @ 21:40)  HR: 74 (22 @ 05:35)  BP: 129/82 (22 @ 05:35)  BP(mean): --  RR: 18 (22 @ 05:35)  SpO2: 96% (22 @ 05:35)  Wt(kg): --     @ 07:01  -   @ 07:00  --------------------------------------------------------  IN: 400 mL / OUT: 1800 mL / NET: -1400 mL          Review of Systems:  all other ROS negative       PHYSICAL EXAM:  GENERAL: NAD resting comfortably   CHEST/LUNG: CTA no accessory muscle use  HEART: normal S1S2, RRR  ABDOMEN: Soft, Nontender, +BS,   EXTREMITIES: No clubbing, cyanosis, or edema   ACCESS: +TDC      LABS:                        10.4   6.43  )-----------( 107      ( 07 Sep 2022 07:00 )             30.6         139  |  105  |  100<H>  ----------------------------<  93  4.8   |  16<L>  |  13.19<H>    Ca    6.7<L>      07 Sep 2022 07:00  Phos  8.1       Mg     2.3         TPro  6.4  /  Alb  2.7<L>  /  TBili  0.5  /  DBili  x   /  AST  44<H>  /  ALT  38  /  AlkPhos  318<H>        PT/INR - ( 07 Sep 2022 07:00 )   PT: 11.6 sec;   INR: 0.98          PTT - ( 07 Sep 2022 07:00 )  PTT:30.2 sec          RADIOLOGY & ADDITIONAL STUDIES:        Hemoglobin: 10.4 g/dL (22 @ 07:00)  Phosphorus Level, Serum: 8.1 mg/dL (22 @ 07:00)  Hemoglobin: 9.2 g/dL (22 @ 05:30)  Phosphorus Level, Serum: 7.9 mg/dL (22 @ 05:30)    Hepatitis B Core IgM Antibody: Nonreact (22 @ 07:00)  Albumin, Serum: 2.7 g/dL (22 @ 07:00)    calcium acetate 1334 milliGRAM(s) Oral three times a day with meals, 22 @ 16:47, Routine  calcium acetate 667 milliGRAM(s) Oral two times a day with meals, 22 @ 18:34, Routine  calcium acetate 1334 milliGRAM(s) Oral two times a day with meals, 22 @ 04:35, Routine    Hemodialysis Treatment.:     Schedule: Once, Modality: Hemodialysis, Access: Internal Jugular Central Venous Catheter    Dialyzer: Optiflux [id number], Time: 150 Min    Blood Flow: 250 mL/Min , Dialysate Flow: 500 mL/Min, Dialysate Temp: 36.5, Tubinmm (Adult)    Target Fluid Removal: 1.5 Liters    Dialysate Electrolytes (mEq/L): Potassium 2, Calcium 2.5, Sodium 138, Bicarbonate 35 (22 @ 07:30) [Active]  Hemodialysis Treatment.:     Schedule: Once, Modality: Hemodialysis, Access: Subclavian Central Venous Catheter    Dialyzer: Optiflux [id number], Time: 120 Min    Blood Flow: 200 mL/Min , Dialysate Flow: 500 mL/Min, Dialysate Temp: 36.5, Tubinmm (Adult)    Target Fluid Removal: 1.5 Liters    Dialysate Electrolytes (mEq/L): Potassium 3, Calcium 2.5, Sodium 138, Bicarbonate 35 (22 @ 10:22) [Completed]

## 2022-09-08 NOTE — DISCHARGE NOTE PROVIDER - HOSPITAL COURSE
#Discharge: do not delete    Patient is __ yo M/F with past medical history of _____  Presented with _____, found to have _____  Problem List/Main Diagnoses (system-based):   Inpatient treatment course:   Patient was discharged to:  New medications:   Changes to old medications:   Medications that were stopped:  Items to Follow up as outpatient   Physical exam at time of discharge: #Discharge: do not delete    58 yo M with HTN, DM, CKD, HCV presented w/ sob, abdominal distension, and LE edema. Over the past 2 weeks prior to presenting to the hospital, he has noticed his abdomen expanding and increased LE edema.      Problem List/Main Diagnoses (system-based):       #ESRD (end stage renal disease).   - Cr 13.27 (baseline Cr 10). Follows outpatient w/ nephrologist Dr. Almanza. Likely progressed from CKD to ESRD. Underwent HD catheter placement with IR along with first session of HD on 9/8.     - phoslo bid w/ meals for elevated phosphorus  - adjust medication dosages for level of renal function  - vascular consulted for AVF vein mapping and outpatient AVF formation. Will followup as outpatient for AVF discussion.    #Cirrhosis of liver with ascites.   Patient with new onset ascites present on both CT and US imaging. Liver cirrhosis also present on imaging, likel 2/2 hx of Hep C. Patient self-reported completing HepC treatment, however remains HepC positive. Exact antiviral and completion of HepC treatment is unclear as it is typically extremely effective.     - F/u HBV DNA  - Underwent diagnostic paracentesis on 9/7 with IR - peritoneal fluid studies consistent with cirrhosis  - Will followup with GI as outpatient regarding HepC treatment and management of cirrhotic liver    #Hypertensive urgency.   /106 on admission. No AMS or chest pain but patient has elevated Cr (likely 2/2 CKD rather than HTN) and elevated troponin. On home HCTZ 12.5 qd and losartan 50mg qd. Likely 2/2 medication noncompliance vs volume overload i/s/o CKD    - patient prefers to not take amlodipine due to swelling of legs  - restarted home Losartan 50mg daily  - f/u ECHO    High anion gap metabolic acidosis.   Pt. with anion gap of 20 on presentation. Likely secondary to elevated BUN in the setting of CKD. Expect improvement with HD    - ok to stop bicarb.     Problem/Plan - 5:  ·  Problem: Fall.   ·  Plan: Patient reports fall on right side 2 weeks ago. No ecchymosis on exam.    -PT/OT in AM.     Problem/Plan - 6:  ·  Problem: Hepatitis C.   ·  Plan: Pt. with history of Hep C. He was reportedly treated as outpatient, but was still positive 2-3 months prior to admission. If pt. has cirrhotic findings on US, may be contributing to abdominal distention.     - f/u hep panel  - f/u liver US  - f/u GI recs  - f/u CT abdomen/pelvis    #distended abdomen, tympanitic on physical exam  -abd x-ray no sig findings.     Problem/Plan - 7:  ·  Problem: Diabetes mellitus.   ·  Plan: Pt. with reported history of DM, but not on any medication. A1c 5.2    - ISS.     Problem/Plan - 8:  ·  Problem: Anemia.   ·  Plan: Hgb 9.8 on admission (unknown baseline Hgb). No melena, hematochezia, or signs of active bleeding. Possibly 2/2 CKD.    -iron panel c/w AOCD  -trend CBC  -maintain active T&S  -transfuse if Hgb <7.     Problem/Plan - 9:  ·  Problem: Elevated troponin.   ·  Plan: Trop 0.29 on admission. Patient denies chest pain and no ischemic changes on EKG. Likely 2/2 HUNG on CKD  -troponin peaked, repeat troponin 0.24  - resolved.     Problem/Plan - 10:  ·  Problem: Prophylactic measure.   ·  Plan; Dvt ppx: heparin subQ  PPI ppx: None indicated  Diet: Carb consistent with Renal restrictions  Dispo: [facility].    Inpatient treatment course:   Patient was discharged to:  New medications:   Changes to old medications:   Medications that were stopped:  Items to Follow up as outpatient   Physical exam at time of discharge: #Discharge: do not delete    58 yo M with HTN, DM, CKD, HCV presented w/ sob, abdominal distension, and LE edema. Over the past 2 weeks prior to presenting to the hospital, he has noticed his abdomen expanding and increased LE edema.      Problem List/Main Diagnoses (system-based):     #ESRD (end stage renal disease).   - Cr 13.27 (baseline Cr 10). Follows outpatient w/ nephrologist Dr. Almanza. Likely progressed from CKD to ESRD. Underwent HD catheter placement with IR along with first session of HD on 9/8.     - phoslo bid w/ meals for elevated phosphorus  - adjust medication dosages for level of renal function  - vascular consulted for AVF vein mapping and outpatient AVF formation. Will followup as outpatient for AVF discussion.    #Cirrhosis of liver with ascites.   Patient with new onset ascites present on both CT and US imaging. Liver cirrhosis also present on imaging, likely 2/2 hx of Hep C. Patient self-reported completing HepC treatment, however remains HepC positive. Exact antiviral and completion of HepC treatment is unclear as it is typically extremely effective.     - F/u HBV DNA  - Underwent diagnostic paracentesis on 9/7 with IR - peritoneal fluid studies consistent with cirrhosis  - Will followup with GI as outpatient regarding HepC treatment and management of cirrhotic liver    #Hypertensive urgency.   /106 on admission. No AMS or chest pain but patient has elevated Cr (likely 2/2 CKD rather than HTN) and elevated troponin. On home HCTZ 12.5 qd and losartan 50mg qd. Likely 2/2 medication noncompliance vs volume overload i/s/o CKD    - patient prefers to not take amlodipine due to swelling of legs  - restarted home Losartan 50mg daily  - f/u ECHO    #High anion gap metabolic acidosis.   Pt. with anion gap of 20 on presentation. Likely secondary to elevated BUN in the setting of CKD. Expect improvement with HD    #Fall.   Patient reports fall on right side 2 weeks ago. No ecchymosis on exam.    #Hepatitis C.   Pt. with history of Hep C. He was reportedly treated as outpatient, but was still positive 2-3 months prior to admission. Unclear if patient has completed HepC treatment. Additionally, HepB core ab was positive, but unsure if false positive or not. Will follow up HBV DNA and have outpatient followup with GI.    Diabetes mellitus.   Pt. with reported history of DM, but not on any medication. A1c 5.2    #Anemia.   -Hgb 9.8 on admission (unknown baseline Hgb). No melena, hematochezia, or signs of active bleeding. Possibly 2/2 CKD. Iron panel consistent with AOCD.    #Elevated troponin.   Trop 0.29 on admission. Patient denies chest pain and no ischemic changes on EKG. Likely 2/2 HUNG on CKD    Inpatient treatment course:     Patient was discharged to: Home    New medications:     Changes to old medications:     Medications that were stopped:    Items to Follow up as outpatient     Physical exam at time of discharge:    #Discharge: do not delete    58 yo M with HTN, DM, CKD, HCV presented w/ sob, abdominal distension, and LE edema. Over the past 2 weeks prior to presenting to the hospital, he has noticed his abdomen expanding and increased LE edema.    Problem List/Main Diagnoses (system-based):     #ESRD (end stage renal disease).   - Cr 13.27 (baseline Cr 10). Follows outpatient w/ nephrologist Dr. Almanza. Likely progressed from CKD to ESRD. Underwent HD catheter placement with IR along with first session of HD on 9/8.     - phoslo bid w/ meals for elevated phosphorus  - adjust medication dosages for level of renal function  - vascular consulted for AVF vein mapping and outpatient AVF formation. Will followup as outpatient for AVF discussion.    #Cirrhosis of liver with ascites.   Patient with new onset ascites present on both CT and US imaging. Liver cirrhosis also present on imaging, likely 2/2 hx of Hep C. Patient self-reported completing HepC treatment, however remains HepC positive. Exact antiviral and completion of HepC treatment is unclear as it is typically extremely effective.     - F/u HBV DNA  - Underwent diagnostic paracentesis on 9/7 with IR - peritoneal fluid studies consistent with cirrhosis  - Will followup with GI as outpatient regarding HepC treatment and management of cirrhotic liver    #Hypertensive urgency.   /106 on admission. No AMS or chest pain but patient has elevated Cr (likely 2/2 CKD rather than HTN) and elevated troponin. On home HCTZ 12.5 qd and losartan 50mg qd. Likely 2/2 medication noncompliance vs volume overload i/s/o CKD    - patient prefers to not take amlodipine due to swelling of legs  - restarted home Losartan 50mg daily  - f/u ECHO    #High anion gap metabolic acidosis.   Pt. with anion gap of 20 on presentation. Likely secondary to elevated BUN in the setting of CKD. Expect improvement with HD    #Fall.   Patient reports fall on right side 2 weeks ago. No ecchymosis on exam.    #Hepatitis C.   Pt. with history of Hep C. He was reportedly treated as outpatient, but was still positive 2-3 months prior to admission. Unclear if patient has completed HepC treatment. Additionally, HepB core ab was positive, but unsure if false positive or not. Will follow up HBV DNA and have outpatient followup with GI.    Diabetes mellitus.   Pt. with reported history of DM, but not on any medication. A1c 5.2    #Anemia.   -Hgb 9.8 on admission (unknown baseline Hgb). No melena, hematochezia, or signs of active bleeding. Possibly 2/2 CKD. Iron panel consistent with AOCD.    #Elevated troponin.   Trop 0.29 on admission. Patient denies chest pain and no ischemic changes on EKG. Likely 2/2 HUNG on CKD    Inpatient treatment course: Patient was admitted to the hospital for     Patient was discharged to: Home    New medications:     Changes to old medications:     Medications that were stopped:    Items to Follow up as outpatient     Physical exam at time of discharge:    #Discharge: do not delete    56 yo M with HTN, DM, CKD, HCV presented w/ sob, abdominal distension, and LE edema. Over the past 2 weeks prior to presenting to the hospital, he has noticed his abdomen expanding and increased LE edema.    Problem List/Main Diagnoses (system-based):     #ESRD (end stage renal disease).   - Cr 13.27 (baseline Cr 10). Follows outpatient w/ nephrologist Dr. Almanza. Likely progressed from CKD to ESRD. Underwent HD catheter placement with IR along with first session of HD on 9/8.     - phoslo bid w/ meals for elevated phosphorus  - adjust medication dosages for level of renal function  - vascular consulted for AVF vein mapping and outpatient AVF formation. Will followup as outpatient for AVF discussion.    #Cirrhosis of liver with ascites.   Patient with new onset ascites present on both CT and US imaging. Liver cirrhosis also present on imaging, likely 2/2 hx of Hep C. Patient self-reported completing HepC treatment, however remains HepC positive. Exact antiviral and completion of HepC treatment is unclear as it is typically extremely effective.     - F/u HBV DNA  - Underwent diagnostic paracentesis on 9/7 with IR - peritoneal fluid studies consistent with cirrhosis  - Will followup with GI as outpatient regarding HepC treatment and management of cirrhotic liver    #Hypertensive urgency.   /106 on admission. No AMS or chest pain but patient has elevated Cr (likely 2/2 CKD rather than HTN) and elevated troponin. On home HCTZ 12.5 qd and losartan 50mg qd. Likely 2/2 medication noncompliance vs volume overload i/s/o CKD    - patient prefers to not take amlodipine due to swelling of legs  - restarted home Losartan 50mg daily  - f/u ECHO    #High anion gap metabolic acidosis.   Pt. with anion gap of 20 on presentation. Likely secondary to elevated BUN in the setting of CKD. Expect improvement with HD    #Fall.   Patient reports fall on right side 2 weeks ago. No ecchymosis on exam.    #Hepatitis C.   Pt. with history of Hep C. He was reportedly treated as outpatient, but was still positive 2-3 months prior to admission. Unclear if patient has completed HepC treatment. Additionally, HepB core ab was positive, but unsure if false positive or not. Will follow up HBV DNA and have outpatient followup with GI.    Diabetes mellitus.   Pt. with reported history of DM, but not on any medication. A1c 5.2    #Anemia.   -Hgb 9.8 on admission (unknown baseline Hgb). No melena, hematochezia, or signs of active bleeding. Possibly 2/2 CKD. Iron panel consistent with AOCD.    #Elevated troponin.   Trop 0.29 on admission. Patient denies chest pain and no ischemic changes on EKG. Likely 2/2 HUNG on CKD    Inpatient treatment course: Patient was admitted to the hospital for new onset abdominal distension, lower extremity edema, and shortness of breath. Patient was      Patient was discharged to: Home    New medications:     Changes to old medications:     Medications that were stopped:    Items to Follow up as outpatient     Physical exam at time of discharge:    #Discharge: do not delete    58 yo M with HTN, DM, CKD, HCV presented w/ sob, abdominal distension, and LE edema. Over the past 2 weeks prior to presenting to the hospital, he has noticed his abdomen expanding and increased LE edema.    Problem List/Main Diagnoses (system-based):     #ESRD (end stage renal disease).   - Cr 13.27 (baseline Cr 10). Follows outpatient w/ nephrologist Dr. Almanza. Likely progressed from CKD to ESRD. Underwent HD catheter placement with IR along with first session of HD on 9/8.     - phoslo bid w/ meals for elevated phosphorus  - adjust medication dosages for level of renal function  - vascular consulted for AVF vein mapping and outpatient AVF formation. Will followup as outpatient for AVF discussion.    #Cirrhosis of liver with ascites.   Patient with new onset ascites present on both CT and US imaging. Liver cirrhosis also present on imaging, likely 2/2 hx of Hep C. Patient self-reported completing HepC treatment, however remains HepC positive. Exact antiviral and completion of HepC treatment is unclear as it is typically extremely effective.     - F/u HBV DNA  - Underwent diagnostic paracentesis on 9/7 with IR - peritoneal fluid studies consistent with cirrhosis  - Will followup with GI as outpatient regarding HepC treatment and management of cirrhotic liver    #Hypertensive urgency.   /106 on admission. No AMS or chest pain but patient has elevated Cr (likely 2/2 CKD rather than HTN) and elevated troponin. On home HCTZ 12.5 qd and losartan 50mg qd. Likely 2/2 medication noncompliance vs volume overload i/s/o CKD    - patient prefers to not take amlodipine due to swelling of legs  - restarted home Losartan 50mg daily  - f/u ECHO    #High anion gap metabolic acidosis.   Pt. with anion gap of 20 on presentation. Likely secondary to elevated BUN in the setting of CKD. Expect improvement with HD    #Fall.   Patient reports fall on right side 2 weeks ago. No ecchymosis on exam.    #Hepatitis C.   Pt. with history of Hep C. He was reportedly treated as outpatient, but was still positive 2-3 months prior to admission. Unclear if patient has completed HepC treatment. Additionally, HepB core ab was positive, but unsure if false positive or not. Will follow up HBV DNA and have outpatient followup with GI.    Diabetes mellitus.   Pt. with reported history of DM, but not on any medication. A1c 5.2    #Anemia.   -Hgb 9.8 on admission (unknown baseline Hgb). No melena, hematochezia, or signs of active bleeding. Possibly 2/2 CKD. Iron panel consistent with AOCD.    #Elevated troponin.   Trop 0.29 on admission. Patient denies chest pain and no ischemic changes on EKG. Likely 2/2 HUNG on CKD    Inpatient treatment course: Patient was admitted to the hospital for new onset abdominal distension, lower extremity edema, and shortness of breath. Patient was also found to be in hypertensive urgency with blood pressure of 171/106. Elevated Cr was thought to be due to progression of CKD rather than HTN. Received amlodipine 5mg, coreg 12.5, losartan 100mg, and lasix 40mg IV in ED, likely 2/2 medication noncompliance vs volume overload i/s/o HUNG on CKD. Renal was consulted, and given patient's progressed CKD, high anion gap metabolic acidosis, fluid overload, and anuria, patient was given HD catheter and started on HD. Cr, acidosis, and fluid status improved with HD. He also underwent diagnostic paracentesis with IR for his new onset ascites. Peritoneal fluid was consistent with liver cirrhosis with portal vein hypertension. Lastly, patient's liver cirrhosis confirmed on imaging. Given patient's unclear history of HepC with treatment, he will followup outpatient with GI.     Patient was discharged to: Home    New medications: None    Changes to old medications: None    Medications that were stopped:     Items to Follow up as outpatient     Physical exam at time of discharge:    #Discharge: do not delete    58 yo M with HTN, DM, CKD, HCV presented w/ sob, abdominal distension, and LE edema. Over the past 2 weeks prior to presenting to the hospital, he has noticed his abdomen expanding and increased LE edema.    Problem List/Main Diagnoses (system-based):     #ESRD (end stage renal disease).   - Cr 13.27 (baseline Cr 10). Follows outpatient w/ nephrologist Dr. Almanza. Likely progressed from CKD to ESRD. Underwent HD catheter placement with IR along with first session of HD on 9/8.     - phoslo bid w/ meals for elevated phosphorus  - adjust medication dosages for level of renal function  - vascular consulted for AVF vein mapping and outpatient AVF formation. Will followup as outpatient for AVF discussion.    #Cirrhosis of liver with ascites.   Patient with new onset ascites present on both CT and US imaging. Liver cirrhosis also present on imaging, likely 2/2 hx of Hep C. Patient self-reported completing HepC treatment, however remains HepC positive. Exact antiviral and completion of HepC treatment is unclear as it is typically extremely effective.     - F/u HBV DNA  - Underwent diagnostic paracentesis on 9/7 with IR - peritoneal fluid studies consistent with cirrhosis  - Will followup with GI as outpatient regarding HepC treatment and management of cirrhotic liver    #Hypertensive urgency.   /106 on admission. No AMS or chest pain but patient has elevated Cr (likely 2/2 CKD rather than HTN) and elevated troponin. On home HCTZ 12.5 qd and losartan 50mg qd. Likely 2/2 medication noncompliance vs volume overload i/s/o CKD    - patient prefers to not take amlodipine due to swelling of legs  - restarted home Losartan 50mg daily  - f/u ECHO    #High anion gap metabolic acidosis.   Pt. with anion gap of 20 on presentation. Likely secondary to elevated BUN in the setting of CKD. Expect improvement with HD    #Fall.   Patient reports fall on right side 2 weeks ago. No ecchymosis on exam.    #Hepatitis C.   Pt. with history of Hep C. He was reportedly treated as outpatient, but was still positive 2-3 months prior to admission. Unclear if patient has completed HepC treatment. Additionally, HepB core ab was positive, but unsure if false positive or not. Will follow up HBV DNA and have outpatient followup with GI.    Diabetes mellitus.   Pt. with reported history of DM, but not on any medication. A1c 5.2    #Anemia.   -Hgb 9.8 on admission (unknown baseline Hgb). No melena, hematochezia, or signs of active bleeding. Possibly 2/2 CKD. Iron panel consistent with AOCD.    #Elevated troponin.   Trop 0.29 on admission. Patient denies chest pain and no ischemic changes on EKG. Likely 2/2 HUNG on CKD    Inpatient treatment course: Patient was admitted to the hospital for new onset abdominal distension, lower extremity edema, and shortness of breath. Patient was also found to be in hypertensive urgency with blood pressure of 171/106. Elevated Cr was thought to be due to progression of CKD rather than HTN. Received amlodipine 5mg, coreg 12.5, losartan 100mg, and lasix 40mg IV in ED, likely 2/2 medication noncompliance vs volume overload i/s/o HUNG on CKD. Renal was consulted, and given patient's progressed CKD, high anion gap metabolic acidosis, fluid overload, and anuria, patient was given HD catheter and started on HD. Cr, acidosis, and fluid status improved with HD. He also underwent diagnostic paracentesis with IR for his new onset ascites. Peritoneal fluid was consistent with liver cirrhosis with portal vein hypertension. Lastly, patient's liver cirrhosis confirmed on imaging. Given patient's unclear history of HepC with treatment, he will followup outpatient with GI.     Patient was discharged to: Home    New medications: None    Changes to old medications: None    Medications that were stopped:     Items to Follow up as outpatient:     Physical exam at time of discharge: A&Ox3, heart RRR, lungs CTAB, abdomen soft, NT, distended but improving with HD. No neurological deficits.    #Discharge: do not delete    58 yo M with HTN, DM, CKD, HCV presented w/ sob, abdominal distension, and LE edema. Over the past 2 weeks prior to presenting to the hospital, he has noticed his abdomen expanding and increased LE edema.    Problem List/Main Diagnoses (system-based):     #ESRD (end stage renal disease).   - Cr 13.27 (baseline Cr 10). Follows outpatient w/ nephrologist Dr. Almanza. Likely progressed from CKD to ESRD. Underwent HD catheter placement with IR along with first session of HD on 9/8.     - phoslo bid w/ meals for elevated phosphorus  - adjust medication dosages for level of renal function  - vascular consulted for AVF vein mapping and outpatient AVF formation. Will followup as outpatient for AVF discussion.    #Cirrhosis of liver with ascites.   Patient with new onset ascites present on both CT and US imaging. Liver cirrhosis also present on imaging, likely 2/2 hx of Hep C. Patient self-reported completing HepC treatment, however remains HepC positive. Exact antiviral and completion of HepC treatment is unclear as it is typically extremely effective.     - F/u HBV DNA  - Underwent diagnostic paracentesis on 9/7 with IR - peritoneal fluid studies consistent with cirrhosis  - Will followup with GI as outpatient regarding HepC treatment and management of cirrhotic liver    #Hypertensive urgency.   /106 on admission. No AMS or chest pain but patient has elevated Cr (likely 2/2 CKD rather than HTN) and elevated troponin. On home HCTZ 12.5 qd and losartan 50mg qd. Likely 2/2 medication noncompliance vs volume overload i/s/o CKD    - patient prefers to not take amlodipine due to swelling of legs  - restarted home Losartan 50mg daily  - f/u ECHO    #High anion gap metabolic acidosis.   Pt. with anion gap of 20 on presentation. Likely secondary to elevated BUN in the setting of CKD. Expect improvement with HD    #Fall.   Patient reports fall on right side 2 weeks ago. No ecchymosis on exam.    #Hepatitis C.   Pt. with history of Hep C. He was reportedly treated as outpatient, but was still positive 2-3 months prior to admission. Unclear if patient has completed HepC treatment. Additionally, HepB core ab was positive, but unsure if false positive or not. Will follow up HBV DNA and have outpatient followup with GI.    Diabetes mellitus.   Pt. with reported history of DM, but not on any medication. A1c 5.2    #Anemia.   -Hgb 9.8 on admission (unknown baseline Hgb). No melena, hematochezia, or signs of active bleeding. Possibly 2/2 CKD. Iron panel consistent with AOCD.    #Elevated troponin.   Trop 0.29 on admission. Patient denies chest pain and no ischemic changes on EKG. Likely 2/2 HUNG on CKD    Inpatient treatment course: Patient was admitted to the hospital for new onset abdominal distension, lower extremity edema, and shortness of breath. Patient was also found to be in hypertensive urgency with blood pressure of 171/106. Elevated Cr was thought to be due to progression of CKD rather than HTN. Received amlodipine 5mg, coreg 12.5, losartan 100mg, and lasix 40mg IV in ED, likely 2/2 medication noncompliance vs volume overload i/s/o HUNG on CKD. Renal was consulted, and given patient's progressed CKD, high anion gap metabolic acidosis, fluid overload, and anuria, patient was given HD catheter and started on HD. Cr, acidosis, and fluid status improved with HD. He also underwent diagnostic paracentesis with IR for his new onset ascites. Peritoneal fluid was consistent with liver cirrhosis with portal vein hypertension. Lastly, patient's liver cirrhosis confirmed on imaging. Given patient's unclear history of HepC with treatment, he will followup outpatient with GI.     Patient was discharged to: Home    New medications: Calcium acetate 667mg three times with meals    Changes to old medications: None    Medications that were stopped: None    Items to Follow up as outpatient: outpatient HD    Physical exam at time of discharge: A&Ox3, heart RRR, lungs CTAB, abdomen soft, NT, distended but improving with HD. No neurological deficits.    #Discharge: do not delete    58 yo M with HTN, DM, CKD, HCV presented w/ sob, abdominal distension, and LE edema. Over the past 2 weeks prior to presenting to the hospital, he has noticed his abdomen expanding and increased LE edema.    Problem List/Main Diagnoses (system-based):     #ESRD (end stage renal disease).   - Cr 13.27 (baseline Cr 10). Follows outpatient w/ nephrologist Dr. Almanza. Likely progressed from CKD to ESRD. Underwent HD catheter placement with IR along with first session of HD on 9/8.     - phoslo bid w/ meals for elevated phosphorus  - adjust medication dosages for level of renal function  - vascular consulted for AVF vein mapping and outpatient AVF formation. Will followup as outpatient for AVF discussion.    #Cirrhosis of liver with ascites.   Patient with new onset ascites present on both CT and US imaging. Liver cirrhosis also present on imaging, likely 2/2 hx of Hep C. Patient self-reported completing HepC treatment, however remains HepC positive. Exact antiviral and completion of HepC treatment is unclear as it is typically extremely effective.     - F/u HBV DNA  - Underwent diagnostic paracentesis on 9/7 with IR - peritoneal fluid studies consistent with cirrhosis  - Will followup with GI as outpatient regarding HepC treatment and management of cirrhotic liver    #Hypertensive urgency.   /106 on admission. No AMS or chest pain but patient has elevated Cr (likely 2/2 CKD rather than HTN) and elevated troponin. On home HCTZ 12.5 qd and losartan 50mg qd. Likely 2/2 medication noncompliance vs volume overload i/s/o CKD    - patient prefers to not take amlodipine due to swelling of legs  - restarted home Losartan 50mg daily      #High anion gap metabolic acidosis.   Pt. with anion gap of 20 on presentation. Likely secondary to elevated BUN in the setting of CKD. Expect improvement with HD    #Fall.   Patient reports fall on right side 2 weeks ago. No ecchymosis on exam.    #Hepatitis C.   Pt. with history of Hep C. He was reportedly treated as outpatient, but was still positive 2-3 months prior to admission. Unclear if patient has completed HepC treatment. Additionally, HepB core ab was positive, but unsure if false positive or not. Will follow up HBV DNA and have outpatient followup with GI.    Diabetes mellitus.   Pt. with reported history of DM, but not on any medication. A1c 5.2    #Anemia.   -Hgb 9.8 on admission (unknown baseline Hgb). No melena, hematochezia, or signs of active bleeding. Possibly 2/2 CKD. Iron panel consistent with AOCD.    #Elevated troponin.   Trop 0.29 on admission. Patient denies chest pain and no ischemic changes on EKG. Likely 2/2 HUNG on CKD    Inpatient treatment course: Patient was admitted to the hospital for new onset abdominal distension, lower extremity edema, and shortness of breath. Patient was also found to be in hypertensive urgency with blood pressure of 171/106. Elevated Cr was thought to be due to progression of CKD rather than HTN. Received amlodipine 5mg, coreg 12.5, losartan 100mg, and lasix 40mg IV in ED, likely 2/2 medication noncompliance vs volume overload i/s/o HUNG on CKD. Renal was consulted, and given patient's progressed CKD, high anion gap metabolic acidosis, fluid overload, and anuria, patient was given HD catheter and started on HD. Cr, acidosis, and fluid status improved with HD. He also underwent diagnostic paracentesis with IR for his new onset ascites. Peritoneal fluid was consistent with liver cirrhosis with portal vein hypertension. Lastly, patient's liver cirrhosis confirmed on imaging. Given patient's unclear history of HepC with treatment, he will followup outpatient with GI.     Patient was discharged to: Home    New medications: Calcium acetate 667mg three times with meals    Changes to old medications: None    Medications that were stopped: None    Items to Follow up as outpatient: outpatient HD    Physical exam at time of discharge: A&Ox3, heart RRR, lungs CTAB, abdomen soft, NT, distended but improving with HD. No neurological deficits.    #Discharge: do not delete    58 yo M with HTN, DM, CKD, HCV presented w/ sob, abdominal distension, and LE edema. Over the past 2 weeks prior to presenting to the hospital, he has noticed his abdomen expanding and increased LE edema.    Problem List/Main Diagnoses (system-based):     #ESRD (end stage renal disease).   - Cr 13.27 (baseline Cr 10). Follows outpatient w/ nephrologist Dr. Almanza. Likely progressed from CKD to ESRD. Underwent HD catheter placement with IR along with first session of HD on 9/8.     - phoslo bid w/ meals for elevated phosphorus  - adjust medication dosages for level of renal function  - vascular consulted for AVF vein mapping and outpatient AVF formation. Will followup as outpatient for AVF discussion.    #Cirrhosis of liver with ascites.   Patient with new onset ascites present on both CT and US imaging. Liver cirrhosis also present on imaging, likely 2/2 hx of Hep C. Patient self-reported completing HepC treatment, however remains HepC positive. Exact antiviral and completion of HepC treatment is unclear as it is typically extremely effective.     - F/u HBV DNA  - Underwent diagnostic paracentesis on 9/7 with IR - peritoneal fluid studies consistent with cirrhosis  - Will followup with GI as outpatient regarding HepC treatment and management of cirrhotic liver    #Hypertensive urgency.   /106 on admission. No AMS or chest pain but patient has elevated Cr (likely 2/2 CKD rather than HTN) and elevated troponin. On home HCTZ 12.5 qd and losartan 50mg qd. Likely 2/2 medication noncompliance vs volume overload i/s/o CKD    - patient prefers to not take amlodipine due to swelling of legs  - restarted home Losartan 50mg daily  - echo with hyperdynamic LV with EF of >75%, grade 1 diastolic dysfunction  - will require followup with cardiology as outpatient    #High anion gap metabolic acidosis.   Pt. with anion gap of 20 on presentation. Likely secondary to elevated BUN in the setting of CKD. Expect improvement with HD    #Fall.   Patient reports fall on right side 2 weeks ago. No ecchymosis on exam.    #Hepatitis C.   Pt. with history of Hep C. He was reportedly treated as outpatient, but was still positive 2-3 months prior to admission. Unclear if patient has completed HepC treatment. Additionally, HepB core ab was positive, but unsure if false positive or not. Will follow up HBV DNA and have outpatient followup with GI.    Diabetes mellitus.   Pt. with reported history of DM, but not on any medication. A1c 5.2    #Anemia.   -Hgb 9.8 on admission (unknown baseline Hgb). No melena, hematochezia, or signs of active bleeding. Possibly 2/2 CKD. Iron panel consistent with AOCD.    #Elevated troponin.   Trop 0.29 on admission. Patient denies chest pain and no ischemic changes on EKG. Likely 2/2 HUNG on CKD    Inpatient treatment course: Patient was admitted to the hospital for new onset abdominal distension, lower extremity edema, and shortness of breath. Patient was also found to be in hypertensive urgency with blood pressure of 171/106. Elevated Cr was thought to be due to progression of CKD rather than HTN. Received amlodipine 5mg, coreg 12.5, losartan 100mg, and lasix 40mg IV in ED, likely 2/2 medication noncompliance vs volume overload i/s/o HUNG on CKD. Renal was consulted, and given patient's progressed CKD, high anion gap metabolic acidosis, fluid overload, and anuria, patient was given HD catheter and started on HD. Cr, acidosis, and fluid status improved with HD. He also underwent diagnostic paracentesis with IR for his new onset ascites. Peritoneal fluid was consistent with liver cirrhosis with portal vein hypertension. Lastly, patient's liver cirrhosis confirmed on imaging. Given patient's unclear history of HepC with treatment, he will followup outpatient with GI.     Patient was discharged to: Home    New medications: Calcium acetate 667mg three times with meals    Changes to old medications: None    Medications that were stopped: None    Items to Follow up as outpatient: outpatient HD    Physical exam at time of discharge: A&Ox3, heart RRR, lungs CTAB, abdomen soft, NT, distended but improving with HD. No neurological deficits.    #Discharge: do not delete    58 yo M with HTN, DM, CKD, HCV presented w/ sob, abdominal distension, and LE edema. Over the past 2 weeks prior to presenting to the hospital, he has noticed his abdomen expanding and increased LE edema.    Problem List/Main Diagnoses (system-based):     #ESRD (end stage renal disease).   - Cr 13.27 (baseline Cr 10). Follows outpatient w/ nephrologist Dr. Almanza. Likely progressed from CKD to ESRD. Underwent HD catheter placement with IR along with first session of HD on 9/8.     - phoslo bid w/ meals for elevated phosphorus  - adjust medication dosages for level of renal function  - vascular consulted for AVF vein mapping and outpatient AVF formation. Will followup as outpatient for AVF discussion.    #Cirrhosis of liver with ascites.   Patient with new onset ascites present on both CT and US imaging. Liver cirrhosis also present on imaging, likely 2/2 hx of Hep C. Patient self-reported completing HepC treatment, however remains HepC positive. Exact antiviral and completion of HepC treatment is unclear as it is typically extremely effective.     - F/u HBV DNA  - Underwent diagnostic paracentesis on 9/7 with IR - peritoneal fluid studies consistent with cirrhosis  - Will followup with GI as outpatient regarding HepC treatment and management of cirrhotic liver    #Hypertensive urgency.   /106 on admission. No AMS or chest pain but patient has elevated Cr (likely 2/2 CKD rather than HTN) and elevated troponin. On home HCTZ 12.5 qd and losartan 50mg qd. Likely 2/2 medication noncompliance vs volume overload i/s/o CKD    - c/w home Losartan 50mg daily  - echo with hyperdynamic LV with EF of >75%, grade 1 diastolic dysfunction  - will require followup with cardiology as outpatient    #High anion gap metabolic acidosis.   Pt. with anion gap of 20 on presentation. Likely secondary to elevated BUN in the setting of CKD. Resolved with HD.    #Fall.   Patient reports fall on right side 2 weeks ago. No ecchymosis on exam.    #Hepatitis C.   Pt. with history of Hep C. He was reportedly treated as outpatient, but was still positive 2-3 months prior to admission. Unclear if patient has completed HepC treatment. Additionally, HepB core ab was positive, but unsure if false positive or not. Will follow up HBV DNA and have outpatient followup with GI.    Diabetes mellitus.   Pt. with reported history of DM, but not on any medication. A1c 5.2    #Anemia.   -Hgb 9.8 on admission (unknown baseline Hgb). No melena, hematochezia, or signs of active bleeding. Possibly 2/2 CKD. Iron panel consistent with AOCD.    #Elevated troponin.   Trop 0.29 on admission. Patient denies chest pain and no ischemic changes on EKG. Likely 2/2 HUNG on CKD    Inpatient treatment course: Patient was admitted to the hospital for new onset abdominal distension, lower extremity edema, and shortness of breath. Patient was also found to be in hypertensive urgency with blood pressure of 171/106. Elevated Cr was thought to be due to progression of CKD rather than HTN. Received amlodipine 5mg, coreg 12.5, losartan 100mg, and lasix 40mg IV in ED, likely 2/2 medication noncompliance vs volume overload i/s/o HUNG on CKD. Renal was consulted, and given patient's progressed CKD, high anion gap metabolic acidosis, fluid overload, and anuria, patient was given HD catheter and started on HD. Cr, acidosis, and fluid status improved with HD. He also underwent diagnostic paracentesis with IR for his new onset ascites. Peritoneal fluid was consistent with liver cirrhosis with portal vein hypertension. Lastly, patient's liver cirrhosis confirmed on imaging. Given patient's unclear history of HepC with treatment, he will followup outpatient with GI.     Patient was discharged to: Home    New medications: Calcium acetate 667mg three times with meals    Changes to old medications: None    Medications that were stopped: None    Items to Follow up as outpatient: outpatient HD    Physical exam at time of discharge: A&Ox3, heart RRR, lungs CTAB, abdomen soft, NT, distended but improving with HD. No neurological deficits.    #Discharge: do not delete    56 yo M with HTN, DM, CKD, HCV presented w/ sob, abdominal distension, and LE edema. Over the past 2 weeks prior to presenting to the hospital, he has noticed his abdomen expanding and increased LE edema.    Problem List/Main Diagnoses (system-based):     #ESRD (end stage renal disease).   - Cr 13.27 (baseline Cr 10). Follows outpatient w/ nephrologist Dr. Almanza. Likely progressed from CKD to ESRD. Underwent HD catheter placement with IR along with first session of HD on 9/8.     - phoslo bid w/ meals for elevated phosphorus  - adjust medication dosages for level of renal function  - vascular consulted for AVF vein mapping and outpatient AVF formation. Will followup as outpatient for AVF discussion.    #Cirrhosis of liver with ascites.   Patient with new onset ascites present on both CT and US imaging. Liver cirrhosis also present on imaging, likely 2/2 hx of Hep C. Patient self-reported completing HepC treatment, however remains HepC positive. Exact antiviral and completion of HepC treatment is unclear as it is typically extremely effective.     - F/u HBV DNA  - Underwent diagnostic paracentesis on 9/7 with IR - peritoneal fluid studies consistent with cirrhosis  - Will followup with GI as outpatient regarding HepC treatment and management of cirrhotic liver    #Hypertensive urgency.   /106 on admission. No AMS or chest pain but patient has elevated Cr (likely 2/2 CKD rather than HTN) and elevated troponin. On home HCTZ 12.5 qd and losartan 50mg qd. Likely 2/2 medication noncompliance vs volume overload i/s/o CKD    - c/w home Losartan 50mg daily  - echo with hyperdynamic LV with EF of >75%, grade 1 diastolic dysfunction  - will require followup with cardiology as outpatient    #High anion gap metabolic acidosis.   Pt. with anion gap of 20 on presentation. Likely secondary to elevated BUN in the setting of CKD. Resolved with HD.    #Fall.   Patient reports fall on right side 2 weeks ago. No ecchymosis on exam.    #Hepatitis C.   Pt. with history of Hep C. He was reportedly treated as outpatient, but was still positive 2-3 months prior to admission. Unclear if patient has completed HepC treatment. Additionally, HepB core ab was positive, but unsure if false positive or not. Will follow up HBV DNA and have outpatient followup with GI.    Diabetes mellitus.   Pt. with reported history of DM, but not on any medication. A1c 5.2    #Anemia.   -Hgb 9.8 on admission (unknown baseline Hgb). No melena, hematochezia, or signs of active bleeding. Possibly 2/2 CKD. Iron panel consistent with AOCD.    #Elevated troponin.   Trop 0.29 on admission. Patient denies chest pain and no ischemic changes on EKG. Likely 2/2 HUNG on CKD    Inpatient treatment course: Patient was admitted to the hospital for new onset abdominal distension, lower extremity edema, and shortness of breath. Patient was also found to be in hypertensive urgency with blood pressure of 171/106. Elevated Cr was thought to be due to progression of CKD rather than HTN. Received amlodipine 5mg, coreg 12.5, losartan 100mg, and lasix 40mg IV in ED, likely 2/2 medication noncompliance vs volume overload i/s/o HUNG on CKD. Renal was consulted, and given patient's progressed CKD, high anion gap metabolic acidosis, fluid overload, and anuria, patient was given HD catheter and started on HD. Cr, acidosis, and fluid status improved with HD. He also underwent diagnostic paracentesis with IR for his new onset ascites. Peritoneal fluid was consistent with liver cirrhosis with portal vein hypertension. Lastly, patient's liver cirrhosis confirmed on imaging. Given patient's unclear history of HepC with treatment, he will followup outpatient with GI.     Patient was discharged to: Home    New medications: Calcium acetate 667mg three times with meals, torpol-XL 25mg daily    Changes to old medications: None    Medications that were stopped: None    Items to Follow up as outpatient: outpatient HD    Physical exam at time of discharge: A&Ox3, heart RRR, lungs CTAB, abdomen soft, NT, distended but improving with HD. No neurological deficits.    #Discharge: do not delete    58 yo M with HTN, DM, CKD, HCV presented w/ sob, abdominal distension, and LE edema. Over the past 2 weeks prior to presenting to the hospital, he has noticed his abdomen expanding and increased LE edema.    Problem List/Main Diagnoses (system-based):     #ESRD (end stage renal disease).   - Cr 13.27 (baseline Cr 10). Follows outpatient w/ nephrologist Dr. Almanza. Likely progressed from CKD to ESRD. Underwent HD catheter placement with IR along with first session of HD on 9/8.     - phoslo bid w/ meals for elevated phosphorus  - adjust medication dosages for level of renal function  - vascular consulted for AVF vein mapping and outpatient AVF formation. Will followup as outpatient for AVF discussion.    #Cirrhosis of liver with ascites.   Patient with new onset ascites present on both CT and US imaging. Liver cirrhosis also present on imaging, likely 2/2 hx of Hep C. Patient self-reported completing HepC treatment, however remains HepC positive. Exact antiviral and completion of HepC treatment is unclear as it is typically extremely effective.     - F/u HBV DNA  - Underwent diagnostic paracentesis on 9/7 with IR - peritoneal fluid studies consistent with cirrhosis  - Will followup with GI as outpatient regarding HepC treatment and management of cirrhotic liver    #thrombocytopenia  Platelet dropped from 140 on admission to 50. Likely 2/2 mechanical trauma due to new HD. Followup outpatient.    #Hypertensive urgency.   /106 on admission. No AMS or chest pain but patient has elevated Cr (likely 2/2 CKD rather than HTN) and elevated troponin. On home HCTZ 12.5 qd and losartan 50mg qd. Likely 2/2 medication noncompliance vs volume overload i/s/o CKD    - c/w home Losartan 50mg daily  - echo with hyperdynamic LV with EF of >75%, grade 1 diastolic dysfunction  - will require followup with cardiology as outpatient    #High anion gap metabolic acidosis.   Pt. with anion gap of 20 on presentation. Likely secondary to elevated BUN in the setting of CKD. Resolved with HD.    #Fall.   Patient reports fall on right side 2 weeks ago. No ecchymosis on exam.    #Hepatitis C.   Pt. with history of Hep C. He was reportedly treated as outpatient, but was still positive 2-3 months prior to admission. Unclear if patient has completed HepC treatment. Additionally, HepB core ab was positive, but unsure if false positive or not. Will follow up HBV DNA and have outpatient followup with GI.    Diabetes mellitus.   Pt. with reported history of DM, but not on any medication. A1c 5.2    #Anemia.   -Hgb 9.8 on admission (unknown baseline Hgb). No melena, hematochezia, or signs of active bleeding. Possibly 2/2 CKD. Iron panel consistent with AOCD.    #Elevated troponin.   Trop 0.29 on admission. Patient denies chest pain and no ischemic changes on EKG. Likely 2/2 HUNG on CKD    Inpatient treatment course: Patient was admitted to the hospital for new onset abdominal distension, lower extremity edema, and shortness of breath. Patient was also found to be in hypertensive urgency with blood pressure of 171/106. Elevated Cr was thought to be due to progression of CKD rather than HTN. Received amlodipine 5mg, coreg 12.5, losartan 100mg, and lasix 40mg IV in ED, likely 2/2 medication noncompliance vs volume overload i/s/o HUNG on CKD. Renal was consulted, and given patient's progressed CKD, high anion gap metabolic acidosis, fluid overload, and anuria, patient was given HD catheter and started on HD. Cr, acidosis, and fluid status improved with HD. He also underwent diagnostic paracentesis with IR for his new onset ascites. Peritoneal fluid was consistent with liver cirrhosis with portal vein hypertension. Lastly, patient's liver cirrhosis confirmed on imaging. Given patient's unclear history of HepC with treatment, he will followup outpatient with GI.     Patient was discharged to: Home    New medications: Calcium acetate 667mg three times with meals, torpol-XL 25mg daily    Changes to old medications: None    Medications that were stopped: None    Items to Follow up as outpatient: outpatient HD    Physical exam at time of discharge: A&Ox3, heart RRR, lungs CTAB, abdomen soft, NT, distended but improving with HD. No neurological deficits.    #Discharge: do not delete    56 yo M with HTN, DM, CKD, HCV presented w/ sob, abdominal distension, and LE edema. Over the past 2 weeks prior to presenting to the hospital, he has noticed his abdomen expanding and increased LE edema.    Problem List/Main Diagnoses (system-based):     #ESRD (end stage renal disease) on HD  - Cr 13.27 (baseline Cr 10). Follows outpatient w/ nephrologist Dr. Almanza. Likely progressed from CKD to ESRD. Underwent HD catheter placement with IR along with first session of HD on 9/8.     - phoslo bid w/ meals for elevated phosphorus  - adjust medication dosages for level of renal function  - vascular consulted for AVF vein mapping and outpatient AVF formation. Will followup as outpatient for AVF discussion.    #Decompensated cirrhosis of liver with ascites.   Patient with new onset ascites present on both CT and US imaging. Liver cirrhosis also present on imaging, likely 2/2 hx of Hep C. Patient self-reported completing HepC treatment, however remains HepC positive. Exact antiviral and completion of HepC treatment is unclear as it is typically extremely effective.     - F/u HBV DNA  - Underwent diagnostic paracentesis on 9/7 with IR - peritoneal fluid studies consistent with cirrhosis  - Will followup with GI as outpatient regarding HepC treatment and management of cirrhotic liver    #thrombocytopenia  Platelet dropped from 140 on admission to 50. Likely 2/2 mechanical trauma due to new HD. Followup outpatient.    #Hypertensive urgency.   /106 on admission. No AMS or chest pain but patient has elevated Cr (likely 2/2 CKD rather than HTN) and elevated troponin. On home HCTZ 12.5 qd and losartan 50mg qd. Likely 2/2 medication noncompliance vs volume overload i/s/o CKD    - c/w home Losartan 50mg daily  - echo with hyperdynamic LV with EF of >75%, grade 1 diastolic dysfunction  - will require followup with cardiology as outpatient    #High anion gap metabolic acidosis.   Pt. with anion gap of 20 on presentation. Likely secondary to elevated BUN in the setting of CKD. Resolved with HD.    #Fall.   Patient reports fall on right side 2 weeks ago. No ecchymosis on exam.    #Hepatitis C.   Pt. with history of Hep C. He was reportedly treated as outpatient, but was still positive 2-3 months prior to admission. Unclear if patient has completed HepC treatment. Additionally, HepB core ab was positive, but unsure if false positive or not. Will follow up HBV DNA and have outpatient followup with GI.    Diabetes mellitus.   Pt. with reported history of DM, but not on any medication. A1c 5.2    #Anemia.   -Hgb 9.8 on admission (unknown baseline Hgb). No melena, hematochezia, or signs of active bleeding. Possibly 2/2 CKD. Iron panel consistent with AOCD.    #Elevated troponin.   Trop 0.29 on admission. Patient denies chest pain and no ischemic changes on EKG. Likely 2/2 HUNG on CKD    Inpatient treatment course: Patient was admitted to the hospital for new onset abdominal distension, lower extremity edema, and shortness of breath. Patient was also found to be in hypertensive urgency with blood pressure of 171/106. Elevated Cr was thought to be due to progression of CKD rather than HTN. Received amlodipine 5mg, coreg 12.5, losartan 100mg, and lasix 40mg IV in ED, likely 2/2 medication noncompliance vs volume overload i/s/o HUNG on CKD. Renal was consulted, and given patient's progressed CKD, high anion gap metabolic acidosis, fluid overload, and anuria, patient was given HD catheter and started on HD. Cr, acidosis, and fluid status improved with HD. He also underwent diagnostic paracentesis with IR for his new onset ascites. Peritoneal fluid was consistent with liver cirrhosis with portal vein hypertension. Lastly, patient's liver cirrhosis confirmed on imaging. Given patient's unclear history of HepC with treatment, he will followup outpatient with GI.     Patient was discharged to: Home    New medications: Calcium acetate 667mg three times with meals, torpol-XL 25mg daily    Changes to old medications: None    Medications that were stopped: None    Items to Follow up as outpatient: outpatient HD    Physical exam at time of discharge: A&Ox3, heart RRR, lungs CTAB, abdomen soft, NT, distended but improving with HD. No neurological deficits.

## 2022-09-08 NOTE — PROGRESS NOTE ADULT - ASSESSMENT
per Internal Medicine    57 y o M w/ a PMHx of HTN, DM, CKD presented w/ sob, abdominal distension, and LE edema x1wk. Found to have HUNG on CKD and HTN.       Problem/Plan - 1:  ·  Problem: ESRD (end stage renal disease).   ·  Plan: Cr 13.27 (baseline Cr 10). Follows outpatient w/ nephrologist Dr. Almanza. Likely progressed from CKD to ESRD. Underwent HD catheter placement with IR today, along with first session of HD.    - Nephrology following, f/u recs  - f/u pth, vit d. Hb not at goal, f/u iron panel  - phoslo bid w/ meals  - adjust medication dosages for level of renal function  - strict I/Os and daily weights  - vascular consulted for AVF vein mapping and outpatient AVF formation.    Problem/Plan - 2:  ·  Problem: Cirrhosis of liver with ascites.   ·  Plan: Patient with new onset ascites present on both CT and US imaging. Liver cirrhosis also present on imaging, likel 2/2 hx of Hep C. Patient self-reported completing HepC treatment, however remains HepC positive. Exact antiviral and completion of HepC treatment is unclear as it is typically extremely effective.     - GI following, f/u recs. HBV DNA ordered.  - Underwent diagnostic paracentesis today with IR - peritoneal fluid studies consistent with cirrhosis.    Problem/Plan - 3:  ·  Problem: Hypertensive urgency.   ·  Plan: /106 on admission. No AMS or chest pain but patient has elevated Cr (likely 2/2 CKD rather than HTN) and elevated troponin. On home HCTZ 12.5 qd and losartan 50mg qd. Received amlodipine 5mg, coreg 12.5, losartan 100mg, and lasix 40mg IV in ED. Likely 2/2 medication noncompliance vs volume overload i/s/o CKD    - monitor BP  - patient unagreeable to amlodipine due to swelling of legs  - ok to restart home losartan 50mg qd  - f/u ECHO.    Problem/Plan - 4:  ·  Problem: High anion gap metabolic acidosis.   ·  Plan: Pt. with anion gap of 20 on presentation. Likely secondary to elevated BUN in the setting of CKD. Expect improvement with HD    - ok to stop bicarb.    Problem/Plan - 5:  ·  Problem: Fall.   ·  Plan: Patient reports fall on right side 2 weeks ago. No ecchymosis on exam.    -PT/OT in AM.    Problem/Plan - 6:  ·  Problem: Hepatitis C.   ·  Plan: Pt. with history of Hep C. He was reportedly treated as outpatient, but was still positive 2-3 months prior to admission. If pt. has cirrhotic findings on US, may be contributing to abdominal distention.     - f/u hep panel  - f/u liver US  - f/u GI recs  - f/u CT abdomen/pelvis    #distended abdomen, tympanitic on physical exam  -abd x-ray no sig findings.    Problem/Plan - 7:  ·  Problem: Diabetes mellitus.   ·  Plan: Pt. with reported history of DM, but not on any medication. A1c 5.2    - ISS.    Problem/Plan - 8:  ·  Problem: Anemia.   ·  Plan: Hgb 9.8 on admission (unknown baseline Hgb). No melena, hematochezia, or signs of active bleeding. Possibly 2/2 CKD.    -iron panel c/w AOCD  -trend CBC  -maintain active T&S  -transfuse if Hgb <7.    Problem/Plan - 9:  ·  Problem: Elevated troponin.   ·  Plan: Trop 0.29 on admission. Patient denies chest pain and no ischemic changes on EKG. Likely 2/2 HUNG on CKD  -troponin peaked, repeat troponin 0.24  - resolved.    Problem/Plan - 10:  ·  Problem: Prophylactic measure.   ·  Plan; Dvt ppx: heparin subQ  PPI ppx: None indicated  Diet: Carb consistent with Renal restrictions  Dispo: [facility].

## 2022-09-08 NOTE — DISCHARGE NOTE PROVIDER - NSDCFUSCHEDAPPT_GEN_ALL_CORE_FT
[facility]  VASCULAR [address]  Scheduled Appointment: 09/19/2022     Jovanni Reno  [facility]  VASCULAR [address]  Scheduled Appointment: 09/19/2022    Tomy López  [facility]  HEARTVASC [address]  Scheduled Appointment: 09/20/2022

## 2022-09-09 ENCOUNTER — TRANSCRIPTION ENCOUNTER (OUTPATIENT)
Age: 57
End: 2022-09-09

## 2022-09-09 DIAGNOSIS — D69.6 THROMBOCYTOPENIA, UNSPECIFIED: ICD-10-CM

## 2022-09-09 DIAGNOSIS — I21.A1 MYOCARDIAL INFARCTION TYPE 2: ICD-10-CM

## 2022-09-09 LAB
ANION GAP SERPL CALC-SCNC: 13 MMOL/L — SIGNIFICANT CHANGE UP (ref 5–17)
APTT BLD: 31.1 SEC — SIGNIFICANT CHANGE UP (ref 27.5–35.5)
BUN SERPL-MCNC: 61 MG/DL — HIGH (ref 7–23)
CALCIUM SERPL-MCNC: 7.2 MG/DL — LOW (ref 8.4–10.5)
CHLORIDE SERPL-SCNC: 100 MMOL/L — SIGNIFICANT CHANGE UP (ref 96–108)
CLOSURE TME COLL+EPINEP BLD: 53 K/UL — LOW (ref 150–400)
CO2 SERPL-SCNC: 24 MMOL/L — SIGNIFICANT CHANGE UP (ref 22–31)
CREAT SERPL-MCNC: 8.97 MG/DL — HIGH (ref 0.5–1.3)
EGFR: 6 ML/MIN/1.73M2 — LOW
GLUCOSE BLDC GLUCOMTR-MCNC: 100 MG/DL — HIGH (ref 70–99)
GLUCOSE BLDC GLUCOMTR-MCNC: 123 MG/DL — HIGH (ref 70–99)
GLUCOSE BLDC GLUCOMTR-MCNC: 180 MG/DL — HIGH (ref 70–99)
GLUCOSE BLDC GLUCOMTR-MCNC: 221 MG/DL — HIGH (ref 70–99)
GLUCOSE SERPL-MCNC: 110 MG/DL — HIGH (ref 70–99)
HAPTOGLOB SERPL-MCNC: 74 MG/DL — SIGNIFICANT CHANGE UP (ref 34–200)
HCT VFR BLD CALC: 26.7 % — LOW (ref 39–50)
HCT VFR BLD CALC: 30.1 % — LOW (ref 39–50)
HGB BLD-MCNC: 10.2 G/DL — LOW (ref 13–17)
HGB BLD-MCNC: 9.3 G/DL — LOW (ref 13–17)
INR BLD: 0.99 — SIGNIFICANT CHANGE UP (ref 0.88–1.16)
LDH SERPL L TO P-CCNC: 424 U/L — HIGH (ref 50–242)
MCHC RBC-ENTMCNC: 28.7 PG — SIGNIFICANT CHANGE UP (ref 27–34)
MCHC RBC-ENTMCNC: 28.8 PG — SIGNIFICANT CHANGE UP (ref 27–34)
MCHC RBC-ENTMCNC: 33.9 GM/DL — SIGNIFICANT CHANGE UP (ref 32–36)
MCHC RBC-ENTMCNC: 34.8 GM/DL — SIGNIFICANT CHANGE UP (ref 32–36)
MCV RBC AUTO: 82.7 FL — SIGNIFICANT CHANGE UP (ref 80–100)
MCV RBC AUTO: 84.8 FL — SIGNIFICANT CHANGE UP (ref 80–100)
NRBC # BLD: 0 /100 WBCS — SIGNIFICANT CHANGE UP (ref 0–0)
NRBC # BLD: 0 /100 WBCS — SIGNIFICANT CHANGE UP (ref 0–0)
PLATELET # BLD AUTO: 44 K/UL — LOW (ref 150–400)
PLATELET # BLD AUTO: 60 K/UL — LOW (ref 150–400)
POTASSIUM SERPL-MCNC: 4 MMOL/L — SIGNIFICANT CHANGE UP (ref 3.5–5.3)
POTASSIUM SERPL-SCNC: 4 MMOL/L — SIGNIFICANT CHANGE UP (ref 3.5–5.3)
PROTHROM AB SERPL-ACNC: 11.8 SEC — SIGNIFICANT CHANGE UP (ref 10.5–13.4)
RBC # BLD: 3.23 M/UL — LOW (ref 4.2–5.8)
RBC # BLD: 3.37 M/UL — LOW (ref 4.2–5.8)
RBC # BLD: 3.55 M/UL — LOW (ref 4.2–5.8)
RBC # FLD: 12.6 % — SIGNIFICANT CHANGE UP (ref 10.3–14.5)
RBC # FLD: 13.1 % — SIGNIFICANT CHANGE UP (ref 10.3–14.5)
RETICS #: 98.7 K/UL — SIGNIFICANT CHANGE UP (ref 25–125)
RETICS/RBC NFR: 2.9 % — HIGH (ref 0.5–2.5)
SODIUM SERPL-SCNC: 137 MMOL/L — SIGNIFICANT CHANGE UP (ref 135–145)
WBC # BLD: 7.88 K/UL — SIGNIFICANT CHANGE UP (ref 3.8–10.5)
WBC # BLD: 8.77 K/UL — SIGNIFICANT CHANGE UP (ref 3.8–10.5)
WBC # FLD AUTO: 7.88 K/UL — SIGNIFICANT CHANGE UP (ref 3.8–10.5)
WBC # FLD AUTO: 8.77 K/UL — SIGNIFICANT CHANGE UP (ref 3.8–10.5)

## 2022-09-09 PROCEDURE — 90935 HEMODIALYSIS ONE EVALUATION: CPT

## 2022-09-09 PROCEDURE — 99232 SBSQ HOSP IP/OBS MODERATE 35: CPT | Mod: GC

## 2022-09-09 RX ORDER — METOPROLOL TARTRATE 50 MG
1 TABLET ORAL
Qty: 30 | Refills: 0
Start: 2022-09-09 | End: 2022-10-08

## 2022-09-09 RX ORDER — METOPROLOL TARTRATE 50 MG
25 TABLET ORAL EVERY 24 HOURS
Refills: 0 | Status: DISCONTINUED | OUTPATIENT
Start: 2022-09-09 | End: 2022-09-10

## 2022-09-09 RX ADMIN — Medication 1334 MILLIGRAM(S): at 18:36

## 2022-09-09 RX ADMIN — LOSARTAN POTASSIUM 50 MILLIGRAM(S): 100 TABLET, FILM COATED ORAL at 16:31

## 2022-09-09 RX ADMIN — CALCITRIOL 0.5 MICROGRAM(S): 0.5 CAPSULE ORAL at 16:30

## 2022-09-09 RX ADMIN — HEPARIN SODIUM 5000 UNIT(S): 5000 INJECTION INTRAVENOUS; SUBCUTANEOUS at 06:30

## 2022-09-09 RX ADMIN — Medication 25 MILLIGRAM(S): at 17:59

## 2022-09-09 RX ADMIN — Medication 1334 MILLIGRAM(S): at 08:08

## 2022-09-09 RX ADMIN — Medication 2: at 18:39

## 2022-09-09 NOTE — PROGRESS NOTE ADULT - PROBLEM SELECTOR PLAN 1
Unclear etiology, suspect in setting of cirrhosis and ESRD  -Check platelet blue top, repeat CBC  -Heme consult

## 2022-09-09 NOTE — PROGRESS NOTE ADULT - PROBLEM SELECTOR PLAN 4
/106 on admission. No AMS or chest pain but patient has elevated Cr (likely 2/2 CKD rather than HTN) and elevated troponin. On home HCTZ 12.5 qd and losartan 50mg qd. Received amlodipine 5mg, coreg 12.5, losartan 100mg, and lasix 40mg IV in ED. Likely 2/2 medication noncompliance vs volume overload i/s/o CKD    - monitor BP  - Will restart home losartan 50mg qd as needed  - echo with hyperdynamic LV with EF of >75%, grade 1 diastolic dysfunction  - will require followup with cardiology as outpatient

## 2022-09-09 NOTE — PROGRESS NOTE ADULT - PROBLEM SELECTOR PLAN 3
Patient with new onset ascites present on both CT and US imaging. Liver cirrhosis also present on imaging, likely 2/2 hx of Hep C. Patient self-reported completing HepC treatment, however remains HepC positive. Exact antiviral and completion of HepC treatment is unclear as it is typically extremely effective.     - GI following, f/u recs. HBV DNA ordered.  - Underwent diagnostic paracentesis today with IR - peritoneal fluid studies consistent with cirrhosis  - Will require GI followup as outpatient

## 2022-09-09 NOTE — PROGRESS NOTE ADULT - PROBLEM SELECTOR PLAN 11
Dvt ppx: heparin subQ  PPI ppx: None indicated  Diet: Carb consistent with Renal restrictions  Dispo: F

## 2022-09-09 NOTE — DISCHARGE NOTE NURSING/CASE MANAGEMENT/SOCIAL WORK - NSDCFUADDAPPT_GEN_ALL_CORE_FT
(1) The GI department will schedule a followup with you with Dr. Ean Prajapati, hepatologist, for followup. Please call [phone] if you do not hear from them within 2 weeks.    (2) Please review your echocardiogram with your PCP and discuss possible referral to cardiology.    Please bring your Insurance card, Photo ID and Discharge paperwork to the following appointment:    (3) Please follow up with your Cardiology Provider, Dr. Tomy López at [address] [place] [address] on 09/20/2022 at 12:20pm.    Appointment was scheduled by Ms. GUERLINE Barry, Referral Coordinator.

## 2022-09-09 NOTE — PROGRESS NOTE ADULT - PROBLEM SELECTOR PLAN 5
Pt. with anion gap of 20 on presentation. Likely secondary to elevated BUN in the setting of CKD. Expect improvement with HD. Off bicarb.

## 2022-09-09 NOTE — PROGRESS NOTE ADULT - SUBJECTIVE AND OBJECTIVE BOX
Patient is a 57y Male seen and evaluated at bedside. No acute distress, patient still states that his abdomen is distended despite passing gas and moving his bowels. Patient seen on HD tolerating well. VSS, plan to continue with HD as planned with current prescription.       Meds:    acetaminophen     Tablet .. 650 every 6 hours PRN  calcitriol   Capsule 0.5 daily  calcium acetate 1334 three times a day with meals  dextrose 5%. 1000 <Continuous>  dextrose 5%. 1000 <Continuous>  dextrose 50% Injectable 25 once  dextrose 50% Injectable 12.5 once  dextrose 50% Injectable 25 once  dextrose Oral Gel 15 once PRN  glucagon  Injectable 1 once  influenza   Vaccine 0.5 once  insulin lispro (ADMELOG) corrective regimen sliding scale  Before meals and at bedtime  losartan 50 every 24 hours  melatonin 3 at bedtime PRN  ondansetron Injectable 4 every 8 hours PRN  polyethylene glycol 3350 17 daily  senna 2 at bedtime      T(C): , Max: 37.7 (09-09-22 @ 09:35)  T(F): , Max: 99.9 (09-09-22 @ 09:35)  HR: 85 (09-09-22 @ 09:35)  BP: 150/98 (09-09-22 @ 09:35)  BP(mean): --  RR: 19 (09-09-22 @ 09:35)  SpO2: 96% (09-09-22 @ 09:35)  Wt(kg): --    09-08 @ 07:01  -  09-09 @ 07:00  --------------------------------------------------------  IN: 700 mL / OUT: 1100 mL / NET: -400 mL          Review of Systems:  ROS negative except as per HPI      PHYSICAL EXAM:  GENERAL: NAD resting comfortably   CHEST/LUNG: CTA no accessory muscle use  HEART: normal S1S2, RRR  ABDOMEN: Soft, Distended, Nontender, +BS,   EXTREMITIES: No clubbing, cyanosis, or edema   ACCESS: +TDC      LABS:                        9.3    7.88  )-----------( 60       ( 09 Sep 2022 08:07 )             26.7     09-09    137  |  100  |  61<H>  ----------------------------<  110<H>  4.0   |  24  |  8.97<H>    Ca    7.2<L>      09 Sep 2022 08:07  Phos  6.2     09-08  Mg     2.1     09-08                  RADIOLOGY & ADDITIONAL STUDIES:

## 2022-09-09 NOTE — PROGRESS NOTE ADULT - PROBLEM SELECTOR PLAN 7
Pt. with history of Hep C. He was reportedly treated as outpatient, but was still positive 2-3 months prior to admission. Pt with evidence of cirrhosis on imaging. Will require GI followup as outpatient.

## 2022-09-09 NOTE — DISCHARGE NOTE NURSING/CASE MANAGEMENT/SOCIAL WORK - NSTRANSFERBELONGINGSDISPO_GEN_A_NUR
What is lung cancer screening? Lung cancer screening is a way in which doctors check the lungs for early signs of cancer in people who have no symptoms of lung cancer. A low-dose CT scan uses much less radiation than a normal CT scan and shows a more detailed image of the lungs than a standard X-ray. The goal of lung cancer screening is to find cancer early, before it has a chance to grow, spread, or cause problems. One large study found that smokers who were screened with low-dose CT scans were less likely to die of lung cancer than those who were screened with standard X-ray. Below is a summary of the things you need to know regarding screening for lung cancer with low-dose computed tomography (LDCT). This is a screening program that involves routine annual screening with LDCT studies of the lung. The LDCTs are done using low-dose radiation that is not thought to increase your cancer risk. If you have other serious medical conditions (other cancers, congestive heart failure) that limit your life expectancy to less than 10 years, you should not undergo lung cancer screening with LDCT. The chance is 20%-60% that the LDCT result will show abnormalities. This would require additional testing which could include repeat imaging or even invasive procedures. Most (about 95%) of \"abnormal\" LDCT results are false in the sense that no lung cancer is ultimately found. Additionally, some (about 10%) of the cancers found would not affect your life expectancy, even if undetected and untreated. If you are still smoking, the single most important thing that you can do to reduce your risk of dying of lung cancer is to quit. For this screening to be covered by Medicare and most other insurers, strict criteria must be met. If you do not meet these criteria, but still wish to undergo LDCT testing, you will be required to sign a waiver indicating your willingness to pay for the scan. with patient

## 2022-09-09 NOTE — PROGRESS NOTE ADULT - PROVIDER SPECIALTY LIST ADULT
Internal Medicine
Internal Medicine
Nephrology
Cardiology
Nephrology
Gastroenterology
Internal Medicine
Nephrology
Rehab Medicine
Internal Medicine
Cardiology
Hospitalist

## 2022-09-09 NOTE — PROGRESS NOTE ADULT - SUBJECTIVE AND OBJECTIVE BOX
O/N Events: PAKO    Subjective/ROS: Patient seen and examined at bedside. Patient feeling well this morning. Complaints of pain in side abdomen at site where paracentesis was performed. Feels his abdomen is less distended than yesterday.    Denies Fever/Chills, HA, CP, SOB, n/v, changes in bowel/urinary habits.  12pt ROS otherwise negative.      Vital Signs Last 24 Hrs  T(C): 37.1 (09 Sep 2022 15:37), Max: 37.7 (09 Sep 2022 09:35)  T(F): 98.7 (09 Sep 2022 15:37), Max: 99.9 (09 Sep 2022 09:35)  HR: 82 (09 Sep 2022 15:37) (82 - 92)  BP: 130/88 (09 Sep 2022 15:37) (129/83 - 150/98)  BP(mean): --  RR: 18 (09 Sep 2022 15:37) (18 - 19)  SpO2: 95% (09 Sep 2022 15:37) (94% - 96%)    Parameters below as of 09 Sep 2022 15:37  Patient On (Oxygen Delivery Method): room air          CAPILLARY BLOOD GLUCOSE      POCT Blood Glucose.: 116 mg/dL (08 Sep 2022 14:04)  POCT Blood Glucose.: 120 mg/dL (08 Sep 2022 08:01)  POCT Blood Glucose.: 133 mg/dL (07 Sep 2022 22:10)      PHYSICAL EXAM  General: NAD, AAOx3  HEENT: atraumatic, normocephalic  Pulmonary: clear to auscultation bilaterally; No wheeze, or ronchi +rales at the bases, L>R  Cardiovascular: Regular rate and rhythm; no murmurs, rubs or gallops. Normal S1S2  Gastrointestinal: firm, nontender, +distended; bowel sounds present, tympanitic abdomen  Musculoskeletal: 2+ peripheral pulses, no clubbing or cyanosis +Edema b/l LE  Neurology: Pt. alert and oriented x3, fluent speech, able to move all extremities. no asterixis  Skin: no rashes or lesions  Psych: normal affect      MEDICATIONS  (STANDING):  calcitriol   Capsule 0.5 MICROGram(s) Oral daily  calcium acetate 1334 milliGRAM(s) Oral three times a day with meals  dextrose 5%. 1000 milliLiter(s) (50 mL/Hr) IV Continuous <Continuous>  dextrose 5%. 1000 milliLiter(s) (100 mL/Hr) IV Continuous <Continuous>  dextrose 50% Injectable 25 Gram(s) IV Push once  dextrose 50% Injectable 12.5 Gram(s) IV Push once  dextrose 50% Injectable 25 Gram(s) IV Push once  glucagon  Injectable 1 milliGRAM(s) IntraMuscular once  influenza   Vaccine 0.5 milliLiter(s) IntraMuscular once  insulin lispro (ADMELOG) corrective regimen sliding scale   SubCutaneous Before meals and at bedtime  losartan 50 milliGRAM(s) Oral every 24 hours  metoprolol succinate ER 25 milliGRAM(s) Oral every 24 hours  polyethylene glycol 3350 17 Gram(s) Oral daily  senna 2 Tablet(s) Oral at bedtime    MEDICATIONS  (PRN):  acetaminophen     Tablet .. 650 milliGRAM(s) Oral every 6 hours PRN Temp greater or equal to 38C (100.4F), Mild Pain (1 - 3)  dextrose Oral Gel 15 Gram(s) Oral once PRN Blood Glucose LESS THAN 70 milliGRAM(s)/deciliter  melatonin 3 milliGRAM(s) Oral at bedtime PRN Insomnia  ondansetron Injectable 4 milliGRAM(s) IV Push every 8 hours PRN Nausea and/or Vomiting        penicillin (Rash)      LABS                                   10.2   8.77  )-----------( 44       ( 09 Sep 2022 17:10 )             30.1   09-09    137  |  100  |  61<H>  ----------------------------<  110<H>  4.0   |  24  |  8.97<H>    Ca    7.2<L>      09 Sep 2022 08:07  Phos  6.2     09-08  Mg     2.1     09-08                IMAGING/EKG/ETC

## 2022-09-09 NOTE — PROGRESS NOTE ADULT - REASON FOR ADMISSION
Hypertensive urgency

## 2022-09-09 NOTE — PROGRESS NOTE ADULT - PROBLEM SELECTOR PLAN 10
Dvt ppx: heparin subQ  PPI ppx: None indicated  Diet: Carb consistent with Renal restrictions  Dispo: F
Dvt ppx: heparin subQ  PPI ppx: None indicated  Diet: Carb consistent with Renal restrictions  Dispo: F
Trop 0.29 on admission. Patient denies chest pain and no ischemic changes on EKG. Likely 2/2 HUNG on CKD  -troponin peaked, repeat troponin 0.24  - resolved
Dvt ppx: heparin subQ  PPI ppx: None indicated  Diet: Carb consistent with Renal restrictions  Dispo: F

## 2022-09-09 NOTE — PROGRESS NOTE ADULT - ATTENDING COMMENTS
seen on HD with Dr Bonilla , agree with above  tolerating rx, VSS   cont HD as above-- may tolerate small amt UF today but unlikely to make significant dent in ascites -- consider repeat para  echo suggested intravascular depletion

## 2022-09-09 NOTE — PROGRESS NOTE ADULT - PROBLEM SELECTOR PLAN 2
Cr 13.27 (baseline Cr 10). Follows outpatient w/ nephrologist Dr. Almanza. Likely progressed from CKD to ESRD. With nausea and one episode of emesis with HD today.    - Nephrology following, f/u recs  - phoslo bid w/ meals  - adjust medication dosages for level of renal function  - strict I/Os and daily weights  - vascular determined no need for vein mapping as inpatient

## 2022-09-09 NOTE — DISCHARGE NOTE NURSING/CASE MANAGEMENT/SOCIAL WORK - NSDCPEFALRISK_GEN_ALL_CORE
For information on Fall & Injury Prevention, visit: [email or web address] OR  [email or web address] OR  https://www.cdc.gov/steadi/patient.html

## 2022-09-09 NOTE — PROGRESS NOTE ADULT - ASSESSMENT
56 yo M with HTN, DM, CKD (baseline Cr. ~10) , HCV, COVID presented to the ED w/ SOB and LE found to have oliguric renal failure w/ HD initiation on .    #CKD progressed to ESRD   Baseline Cr ~ 10  progressive CKD w/ uremic symptoms          -HD #3 today for UF and clearance with a goal of 1 L off.     Hemodialysis Treatment.:     Schedule: Once, Modality: Hemodialysis, Access: Internal Jugular Central Venous Catheter    Dialyzer: Optiflux [id number], Time: 180 Min    Blood Flow: 250 mL/Min , Dialysate Flow: 500 mL/Min, Dialysate Temp: 36.5, Tubinmm (Adult)    Target Fluid Removal: 1 Liters    Dialysate Electrolytes (mEq/L): Potassium 3, Calcium 2.5, Sodium 138, Bicarbonate 35     will need outpatient SW HD set up  TB and quanteferon    #MBD  Calcium 7.2  Phosphorus 6.2    c/w Calcium acetate 667 2TABS TID with meals   c/w Calcitriol .5mcg PO daily   Low phos diet along with renal diet     #Anemia:  Hgb 9.2  will start patient on EPO 4k unites next dialysis session       #HTN:  UF with HD   Continue with home antihypertensives

## 2022-09-09 NOTE — DISCHARGE NOTE NURSING/CASE MANAGEMENT/SOCIAL WORK - PATIENT PORTAL LINK FT
You can access the FollowMyHealth Patient Portal offered by [facility] by registering at the following website: [email or web address]. By joining [facility] FollowMyHealth portal, you will also be able to view your health information using other applications (apps) compatible with our system.

## 2022-09-10 VITALS
OXYGEN SATURATION: 95 % | TEMPERATURE: 98 F | HEART RATE: 69 BPM | RESPIRATION RATE: 18 BRPM | DIASTOLIC BLOOD PRESSURE: 97 MMHG | SYSTOLIC BLOOD PRESSURE: 160 MMHG

## 2022-09-10 LAB
ANION GAP SERPL CALC-SCNC: 11 MMOL/L — SIGNIFICANT CHANGE UP (ref 5–17)
ANISOCYTOSIS BLD QL: SLIGHT — SIGNIFICANT CHANGE UP
BASOPHILS # BLD AUTO: 0.08 K/UL — SIGNIFICANT CHANGE UP (ref 0–0.2)
BASOPHILS NFR BLD AUTO: 0.9 % — SIGNIFICANT CHANGE UP (ref 0–2)
BUN SERPL-MCNC: 41 MG/DL — HIGH (ref 7–23)
CALCIUM SERPL-MCNC: 7.7 MG/DL — LOW (ref 8.4–10.5)
CHLORIDE SERPL-SCNC: 98 MMOL/L — SIGNIFICANT CHANGE UP (ref 96–108)
CO2 SERPL-SCNC: 26 MMOL/L — SIGNIFICANT CHANGE UP (ref 22–31)
CREAT SERPL-MCNC: 6.92 MG/DL — HIGH (ref 0.5–1.3)
EGFR: 9 ML/MIN/1.73M2 — LOW
EOSINOPHIL # BLD AUTO: 0.08 K/UL — SIGNIFICANT CHANGE UP (ref 0–0.5)
EOSINOPHIL NFR BLD AUTO: 0.9 % — SIGNIFICANT CHANGE UP (ref 0–6)
FOLATE SERPL-MCNC: 5.4 NG/ML — SIGNIFICANT CHANGE UP
GIANT PLATELETS BLD QL SMEAR: PRESENT — SIGNIFICANT CHANGE UP
GLUCOSE BLDC GLUCOMTR-MCNC: 145 MG/DL — HIGH (ref 70–99)
GLUCOSE BLDC GLUCOMTR-MCNC: 84 MG/DL — SIGNIFICANT CHANGE UP (ref 70–99)
GLUCOSE SERPL-MCNC: 93 MG/DL — SIGNIFICANT CHANGE UP (ref 70–99)
HCT VFR BLD CALC: 29.6 % — LOW (ref 39–50)
HEPARIN-PF4 AB RESULT: 0.8 U/ML — SIGNIFICANT CHANGE UP (ref 0–0.9)
HGB BLD-MCNC: 10 G/DL — LOW (ref 13–17)
HYPOCHROMIA BLD QL: SIGNIFICANT CHANGE UP
LYMPHOCYTES # BLD AUTO: 0.59 K/UL — LOW (ref 1–3.3)
LYMPHOCYTES # BLD AUTO: 6.9 % — LOW (ref 13–44)
MACROCYTES BLD QL: SLIGHT — SIGNIFICANT CHANGE UP
MANUAL SMEAR VERIFICATION: SIGNIFICANT CHANGE UP
MCHC RBC-ENTMCNC: 28.3 PG — SIGNIFICANT CHANGE UP (ref 27–34)
MCHC RBC-ENTMCNC: 33.8 GM/DL — SIGNIFICANT CHANGE UP (ref 32–36)
MCV RBC AUTO: 83.9 FL — SIGNIFICANT CHANGE UP (ref 80–100)
METAMYELOCYTES # FLD: 0.9 % — HIGH (ref 0–0)
MICROCYTES BLD QL: SLIGHT — SIGNIFICANT CHANGE UP
MONOCYTES # BLD AUTO: 0.45 K/UL — SIGNIFICANT CHANGE UP (ref 0–0.9)
MONOCYTES NFR BLD AUTO: 5.2 % — SIGNIFICANT CHANGE UP (ref 2–14)
MYELOCYTES NFR BLD: 1.7 % — HIGH (ref 0–0)
NEUTROPHILS # BLD AUTO: 7.2 K/UL — SIGNIFICANT CHANGE UP (ref 1.8–7.4)
NEUTROPHILS NFR BLD AUTO: 83.5 % — HIGH (ref 43–77)
NRBC # BLD: 0 /100 WBCS — SIGNIFICANT CHANGE UP (ref 0–0)
NRBC # BLD: 1 /100 — HIGH (ref 0–0)
OVALOCYTES BLD QL SMEAR: SLIGHT — SIGNIFICANT CHANGE UP
PF4 HEPARIN CMPLX AB SER-ACNC: NEGATIVE — SIGNIFICANT CHANGE UP
PLAT MORPH BLD: ABNORMAL
PLATELET # BLD AUTO: 50 K/UL — LOW (ref 150–400)
POLYCHROMASIA BLD QL SMEAR: SLIGHT — SIGNIFICANT CHANGE UP
POTASSIUM SERPL-MCNC: 4 MMOL/L — SIGNIFICANT CHANGE UP (ref 3.5–5.3)
POTASSIUM SERPL-SCNC: 4 MMOL/L — SIGNIFICANT CHANGE UP (ref 3.5–5.3)
RBC # BLD: 3.53 M/UL — LOW (ref 4.2–5.8)
RBC # FLD: 13.2 % — SIGNIFICANT CHANGE UP (ref 10.3–14.5)
RBC BLD AUTO: ABNORMAL
SODIUM SERPL-SCNC: 135 MMOL/L — SIGNIFICANT CHANGE UP (ref 135–145)
SPHEROCYTES BLD QL SMEAR: SLIGHT — SIGNIFICANT CHANGE UP
VIT B12 SERPL-MCNC: 258 PG/ML — SIGNIFICANT CHANGE UP (ref 232–1245)
WBC # BLD: 8.62 K/UL — SIGNIFICANT CHANGE UP (ref 3.8–10.5)
WBC # FLD AUTO: 8.62 K/UL — SIGNIFICANT CHANGE UP (ref 3.8–10.5)

## 2022-09-10 PROCEDURE — 82746 ASSAY OF FOLIC ACID SERUM: CPT

## 2022-09-10 PROCEDURE — 82306 VITAMIN D 25 HYDROXY: CPT

## 2022-09-10 PROCEDURE — 82042 OTHER SOURCE ALBUMIN QUAN EA: CPT

## 2022-09-10 PROCEDURE — 85025 COMPLETE CBC W/AUTO DIFF WBC: CPT

## 2022-09-10 PROCEDURE — 86480 TB TEST CELL IMMUN MEASURE: CPT

## 2022-09-10 PROCEDURE — 83010 ASSAY OF HAPTOGLOBIN QUANT: CPT

## 2022-09-10 PROCEDURE — 85045 AUTOMATED RETICULOCYTE COUNT: CPT

## 2022-09-10 PROCEDURE — 84300 ASSAY OF URINE SODIUM: CPT

## 2022-09-10 PROCEDURE — 87517 HEPATITIS B DNA QUANT: CPT

## 2022-09-10 PROCEDURE — 82595 ASSAY OF CRYOGLOBULIN: CPT

## 2022-09-10 PROCEDURE — 84157 ASSAY OF PROTEIN OTHER: CPT

## 2022-09-10 PROCEDURE — 99291 CRITICAL CARE FIRST HOUR: CPT | Mod: 25

## 2022-09-10 PROCEDURE — 84295 ASSAY OF SERUM SODIUM: CPT

## 2022-09-10 PROCEDURE — 89051 BODY FLUID CELL COUNT: CPT

## 2022-09-10 PROCEDURE — 82310 ASSAY OF CALCIUM: CPT

## 2022-09-10 PROCEDURE — 99238 HOSP IP/OBS DSCHRG MGMT 30/<: CPT | Mod: GC

## 2022-09-10 PROCEDURE — 82570 ASSAY OF URINE CREATININE: CPT

## 2022-09-10 PROCEDURE — 85027 COMPLETE CBC AUTOMATED: CPT

## 2022-09-10 PROCEDURE — 82550 ASSAY OF CK (CPK): CPT

## 2022-09-10 PROCEDURE — 80307 DRUG TEST PRSMV CHEM ANLYZR: CPT

## 2022-09-10 PROCEDURE — 80048 BASIC METABOLIC PNL TOTAL CA: CPT

## 2022-09-10 PROCEDURE — 76937 US GUIDE VASCULAR ACCESS: CPT

## 2022-09-10 PROCEDURE — 83540 ASSAY OF IRON: CPT

## 2022-09-10 PROCEDURE — 88305 TISSUE EXAM BY PATHOLOGIST: CPT

## 2022-09-10 PROCEDURE — 81001 URINALYSIS AUTO W/SCOPE: CPT

## 2022-09-10 PROCEDURE — 83036 HEMOGLOBIN GLYCOSYLATED A1C: CPT

## 2022-09-10 PROCEDURE — 80053 COMPREHEN METABOLIC PANEL: CPT

## 2022-09-10 PROCEDURE — 84133 ASSAY OF URINE POTASSIUM: CPT

## 2022-09-10 PROCEDURE — 36415 COLL VENOUS BLD VENIPUNCTURE: CPT

## 2022-09-10 PROCEDURE — 86900 BLOOD TYPING SEROLOGIC ABO: CPT

## 2022-09-10 PROCEDURE — 84484 ASSAY OF TROPONIN QUANT: CPT

## 2022-09-10 PROCEDURE — 74019 RADEX ABDOMEN 2 VIEWS: CPT

## 2022-09-10 PROCEDURE — 84540 ASSAY OF URINE/UREA-N: CPT

## 2022-09-10 PROCEDURE — 82553 CREATINE MB FRACTION: CPT

## 2022-09-10 PROCEDURE — 85049 AUTOMATED PLATELET COUNT: CPT

## 2022-09-10 PROCEDURE — 84132 ASSAY OF SERUM POTASSIUM: CPT

## 2022-09-10 PROCEDURE — 82607 VITAMIN B-12: CPT

## 2022-09-10 PROCEDURE — 77001 FLUOROGUIDE FOR VEIN DEVICE: CPT

## 2022-09-10 PROCEDURE — 82803 BLOOD GASES ANY COMBINATION: CPT

## 2022-09-10 PROCEDURE — 83735 ASSAY OF MAGNESIUM: CPT

## 2022-09-10 PROCEDURE — 84466 ASSAY OF TRANSFERRIN: CPT

## 2022-09-10 PROCEDURE — 49083 ABD PARACENTESIS W/IMAGING: CPT

## 2022-09-10 PROCEDURE — 93306 TTE W/DOPPLER COMPLETE: CPT

## 2022-09-10 PROCEDURE — 71046 X-RAY EXAM CHEST 2 VIEWS: CPT

## 2022-09-10 PROCEDURE — 83615 LACTATE (LD) (LDH) ENZYME: CPT

## 2022-09-10 PROCEDURE — 90935 HEMODIALYSIS ONE EVALUATION: CPT

## 2022-09-10 PROCEDURE — 85610 PROTHROMBIN TIME: CPT

## 2022-09-10 PROCEDURE — C1769: CPT

## 2022-09-10 PROCEDURE — 99152 MOD SED SAME PHYS/QHP 5/>YRS: CPT

## 2022-09-10 PROCEDURE — 86803 HEPATITIS C AB TEST: CPT

## 2022-09-10 PROCEDURE — 83935 ASSAY OF URINE OSMOLALITY: CPT

## 2022-09-10 PROCEDURE — 82962 GLUCOSE BLOOD TEST: CPT

## 2022-09-10 PROCEDURE — 82728 ASSAY OF FERRITIN: CPT

## 2022-09-10 PROCEDURE — 97161 PT EVAL LOW COMPLEX 20 MIN: CPT

## 2022-09-10 PROCEDURE — 87205 SMEAR GRAM STAIN: CPT

## 2022-09-10 PROCEDURE — 83930 ASSAY OF BLOOD OSMOLALITY: CPT

## 2022-09-10 PROCEDURE — 82436 ASSAY OF URINE CHLORIDE: CPT

## 2022-09-10 PROCEDURE — 87075 CULTR BACTERIA EXCEPT BLOOD: CPT

## 2022-09-10 PROCEDURE — 85730 THROMBOPLASTIN TIME PARTIAL: CPT

## 2022-09-10 PROCEDURE — 87521 HEPATITIS C PROBE&RVRS TRNSC: CPT

## 2022-09-10 PROCEDURE — 83690 ASSAY OF LIPASE: CPT

## 2022-09-10 PROCEDURE — 86850 RBC ANTIBODY SCREEN: CPT

## 2022-09-10 PROCEDURE — 99153 MOD SED SAME PHYS/QHP EA: CPT

## 2022-09-10 PROCEDURE — 83550 IRON BINDING TEST: CPT

## 2022-09-10 PROCEDURE — 86160 COMPLEMENT ANTIGEN: CPT

## 2022-09-10 PROCEDURE — 87340 HEPATITIS B SURFACE AG IA: CPT

## 2022-09-10 PROCEDURE — 86706 HEP B SURFACE ANTIBODY: CPT

## 2022-09-10 PROCEDURE — 36558 INSERT TUNNELED CV CATH: CPT

## 2022-09-10 PROCEDURE — 86022 PLATELET ANTIBODIES: CPT

## 2022-09-10 PROCEDURE — 86901 BLOOD TYPING SEROLOGIC RH(D): CPT

## 2022-09-10 PROCEDURE — 82945 GLUCOSE OTHER FLUID: CPT

## 2022-09-10 PROCEDURE — 86704 HEP B CORE ANTIBODY TOTAL: CPT

## 2022-09-10 PROCEDURE — C1750: CPT

## 2022-09-10 PROCEDURE — 86705 HEP B CORE ANTIBODY IGM: CPT

## 2022-09-10 PROCEDURE — 82977 ASSAY OF GGT: CPT

## 2022-09-10 PROCEDURE — 87070 CULTURE OTHR SPECIMN AEROBIC: CPT

## 2022-09-10 PROCEDURE — 86709 HEPATITIS A IGM ANTIBODY: CPT

## 2022-09-10 PROCEDURE — 76705 ECHO EXAM OF ABDOMEN: CPT

## 2022-09-10 PROCEDURE — 83880 ASSAY OF NATRIURETIC PEPTIDE: CPT

## 2022-09-10 PROCEDURE — 87635 SARS-COV-2 COVID-19 AMP PRB: CPT

## 2022-09-10 PROCEDURE — 88112 CYTOPATH CELL ENHANCE TECH: CPT

## 2022-09-10 PROCEDURE — 74150 CT ABDOMEN W/O CONTRAST: CPT

## 2022-09-10 PROCEDURE — 83970 ASSAY OF PARATHORMONE: CPT

## 2022-09-10 PROCEDURE — 84100 ASSAY OF PHOSPHORUS: CPT

## 2022-09-10 PROCEDURE — 82330 ASSAY OF CALCIUM: CPT

## 2022-09-10 PROCEDURE — 96374 THER/PROPH/DIAG INJ IV PUSH: CPT

## 2022-09-10 RX ADMIN — POLYETHYLENE GLYCOL 3350 17 GRAM(S): 17 POWDER, FOR SOLUTION ORAL at 12:21

## 2022-09-10 RX ADMIN — Medication 1334 MILLIGRAM(S): at 12:21

## 2022-09-10 RX ADMIN — LOSARTAN POTASSIUM 50 MILLIGRAM(S): 100 TABLET, FILM COATED ORAL at 09:07

## 2022-09-10 RX ADMIN — Medication 1334 MILLIGRAM(S): at 09:07

## 2022-09-10 RX ADMIN — CALCITRIOL 0.5 MICROGRAM(S): 0.5 CAPSULE ORAL at 12:21

## 2022-09-10 NOTE — CONSULT NOTE ADULT - ASSESSMENT
58 yo M with PMHx of essential hypertension, DM2, HCV s/p treatment ?eradication, CKD4 (Baseline Cr 10), presents with SOB, abdominal distension and lower extremity edema x 1 week. CKD has progressed to ESRD, started on HD on 09/07. Hematology consulted for evaluation of thrombocytopenia.    #) DIAGNOSIS  - Grade 3 thrombocytopenia [No known baseline]  - Anemia of chronic disease    #) PLAN  - Platelet trend: 127 (09/05) --> 105 (09/06) --> 107 (09/07) --> 77 (09/08) --> 60, 44 (09/09) --> 50 (09/10)  - There is no known baseline platelets. However, cause of thrombocytopenia is likely multifactorial given liver cirrhosis and active HCV. HCV RNA is detected (09/06).   - Dialysis was initiated on 09/07 and corresponds to further drop in platelets. Consider changing the dialysis membrane which can adsorb/remove platelets.    - HIT Ab (09/09) negative, pending VALERIE. 4T Score 3, consistent with low-probability for HIT. Heparin products were started on 09/05, and drop of platelets would be < 4 days of heparin exposure with no recent heparin exposure.   - Send blue top platelets   - Will review peripheral blood smear    To discuss with Dr. Carlos

## 2022-09-10 NOTE — CONSULT NOTE ADULT - CONSULT REQUESTED DATE/TIME
06-Sep-2022 15:04
07-Sep-2022 17:00
10-Sep-2022 13:05
05-Sep-2022 19:15
06-Sep-2022
06-Sep-2022 17:23
07-Sep-2022 10:00
06-Sep-2022 12:15

## 2022-09-10 NOTE — CONSULT NOTE ADULT - CONSULT REASON
request for paracentesis
HTN
AVF
Progression of CKD to ESRD will need new start HD
Thrombocytopenia
Rehab evaluation
abd distension
request for tunneled HD catheter placement

## 2022-09-10 NOTE — CONSULT NOTE ADULT - PROVIDER SPECIALTY LIST ADULT
Gastroenterology
Critical Care
Intervent Radiology
Nephrology
Rehab Medicine
Heme/Onc
Vascular Surgery
Intervent Radiology

## 2022-09-10 NOTE — CONSULT NOTE ADULT - SUBJECTIVE AND OBJECTIVE BOX
Attending: Rowdy    HPI:  56 yo M with HTN, DM, CKD, HCV presented w/ sob, abdominal distension, and LE edema. Over the past 2 weeks he has noticed his abdomen expanding and increased LE edema. Over the past week he has become more dyspneic with exertion. Four days prior to admission, patient also fell on his left side as a result of the increased edema. He follows outpatient nephrology with Dr. Almanza as outpatient. He has discussed dialysis with his nephrologist, but was told he did not require dialysis on his last vitis. Patient also reports history of HCV. He reportedly was treated in the past. He follows with GI for HCV, and per patient was still positive for HCV on his last visit 2-3 months prior. Patient reports he is not always compliant with home medications. Denies fevers, headaches, change in vision, chest pain, n/v/d.    Vascular Surgery:  Patient denies CP/SOB. Denies trauma other central lines. Patient is right hand dominant.     PAST MEDICAL & SURGICAL HISTORY:  Hypertension  Diabetes mellitus  Chronic kidney disease, unspecified CKD stage    MEDICATIONS  (STANDING):  calcitriol   Capsule 0.5 MICROGram(s) Oral daily  calcium acetate 1334 milliGRAM(s) Oral three times a day with meals  dextrose 5%. 1000 milliLiter(s) (100 mL/Hr) IV Continuous <Continuous>  dextrose 5%. 1000 milliLiter(s) (50 mL/Hr) IV Continuous <Continuous>  dextrose 50% Injectable 25 Gram(s) IV Push once  dextrose 50% Injectable 12.5 Gram(s) IV Push once  dextrose 50% Injectable 25 Gram(s) IV Push once  glucagon  Injectable 1 milliGRAM(s) IntraMuscular once  heparin   Injectable 5000 Unit(s) SubCutaneous every 8 hours  influenza   Vaccine 0.5 milliLiter(s) IntraMuscular once  insulin lispro (ADMELOG) corrective regimen sliding scale   SubCutaneous Before meals and at bedtime  polyethylene glycol 3350 17 Gram(s) Oral daily  senna 2 Tablet(s) Oral at bedtime  sodium bicarbonate 650 milliGRAM(s) Oral every 12 hours    MEDICATIONS  (PRN):  acetaminophen     Tablet .. 650 milliGRAM(s) Oral every 6 hours PRN Temp greater or equal to 38C (100.4F), Mild Pain (1 - 3)  dextrose Oral Gel 15 Gram(s) Oral once PRN Blood Glucose LESS THAN 70 milliGRAM(s)/deciliter  melatonin 3 milliGRAM(s) Oral at bedtime PRN Insomnia  ondansetron Injectable 4 milliGRAM(s) IV Push every 8 hours PRN Nausea and/or Vomiting    Allergies  penicillin (Rash)    SOCIAL HISTORY:    FAMILY HISTORY:    Vital Signs Last 24 Hrs  T(C): 36.5 (07 Sep 2022 15:07), Max: 36.8 (07 Sep 2022 05:14)  T(F): 97.7 (07 Sep 2022 15:07), Max: 98.3 (07 Sep 2022 05:14)  HR: 74 (07 Sep 2022 15:07) (70 - 77)  BP: 158/82 (07 Sep 2022 15:07) (130/89 - 179/104)  BP(mean): --  RR: 18 (07 Sep 2022 15:07) (18 - 19)  SpO2: 96% (07 Sep 2022 15:07) (95% - 100%)    Parameters below as of 07 Sep 2022 15:07  Patient On (Oxygen Delivery Method): room air    I&O's Summary    06 Sep 2022 07:01  -  07 Sep 2022 07:00  --------------------------------------------------------  IN: 100 mL / OUT: 150 mL / NET: -50 mL    07 Sep 2022 07:01  -  07 Sep 2022 19:55  --------------------------------------------------------  IN: 400 mL / OUT: 1500 mL / NET: -1100 mL    Physical Exam:  General: NAD, resting comfortably  Pulmonary: normal resp effort  Cardiovascular: NSR  Extremities: WWP, normal strength, no clubbing/cyanosis/edema; 2+ radial pulse  Neuro: A/O x 3    LABS:                      10.4   6.43  )-----------( 107      ( 07 Sep 2022 07:00 )             30.6     09-07    139  |  105  |  100<H>  ----------------------------<  93  4.8   |  16<L>  |  13.19<H>    Ca    6.7<L>      07 Sep 2022 07:00  Phos  8.1     09-07  Mg     2.3     09-07    TPro  6.4  /  Alb  2.7<L>  /  TBili  0.5  /  DBili  x   /  AST  44<H>  /  ALT  38  /  AlkPhos  318<H>  09-07    PT/INR - ( 07 Sep 2022 07:00 )   PT: 11.6 sec;   INR: 0.98          PTT - ( 07 Sep 2022 07:00 )  PTT:30.2 sec    CAPILLARY BLOOD GLUCOSE  POCT Blood Glucose.: 148 mg/dL (06 Sep 2022 21:56)    LIVER FUNCTIONS - ( 07 Sep 2022 07:00 )  Alb: 2.7 g/dL / Pro: 6.4 g/dL / ALK PHOS: 318 U/L / ALT: 38 U/L / AST: 44 U/L / GGT: x       
    HPI:  56 yo M with HTN, DM, CKD (baseline Cr. ~10) , HCV, COVID  presented w/ sob, abdominal distension, and LE edema. Over the past 2 weeks he has noticed his abdomen expanding and increased LE edema. Over the past week he has become more dyspneic with exertion.. He follows outpatient nephrology with Dr. Almanza as outpatient. He has discussed dialysis with his nephrologist, but was told he did not require dialysis on his last vitis. Patient also reports history of HCV. He reportedly was treated in the past. He follows with GI for HCV, and per patient was still positive for HCV on his last visit 2-3 months prior. Patient reports he is not always compliant with home medications. Patient was found to have elevated Cr of 13 and oliguric. Nephrology consulted for initiation of HD.       PAST MEDICAL & SURGICAL HISTORY:  Hypertension      Diabetes mellitus      Chronic kidney disease, unspecified CKD stage            Allergies:  penicillin (Rash)          Hospital Medications:   MEDICATIONS  (STANDING):  calcitriol   Capsule 0.25 MICROGram(s) Oral daily  dextrose 5%. 1000 milliLiter(s) (100 mL/Hr) IV Continuous <Continuous>  dextrose 5%. 1000 milliLiter(s) (50 mL/Hr) IV Continuous <Continuous>  dextrose 50% Injectable 25 Gram(s) IV Push once  dextrose 50% Injectable 12.5 Gram(s) IV Push once  dextrose 50% Injectable 25 Gram(s) IV Push once  furosemide   Injectable 40 milliGRAM(s) IV Push every 12 hours  glucagon  Injectable 1 milliGRAM(s) IntraMuscular once  heparin   Injectable 5000 Unit(s) SubCutaneous every 8 hours  influenza   Vaccine 0.5 milliLiter(s) IntraMuscular once  insulin lispro (ADMELOG) corrective regimen sliding scale   SubCutaneous Before meals and at bedtime  sodium bicarbonate 650 milliGRAM(s) Oral every 12 hours      SOCIAL HISTORY:  Denies ETOh, Smoking,     Family History:  FAMILY HISTORY:        VITALS:  T(F): 97.6 (22 @ 11:45), Max: 98.5 (22 @ 15:49)  HR: 65 (22 @ 11:45)  BP: 148/95 (22 @ 11:45)  RR: 18 (22 @ 11:45)  SpO2: 96% (22 @ 11:45)  Wt(kg): --     @ 07:01  -   @ 07:00  --------------------------------------------------------  IN: 100 mL / OUT: 580 mL / NET: -480 mL     @ 07:01  -   @ 14:06  --------------------------------------------------------  IN: 100 mL / OUT: 150 mL / NET: -50 mL      Height (cm): 182.9 ( @ 15:49)  Weight (kg): 93 (09-05 @ 15:49)  BMI (kg/m2): 27.8 (09-05 @ 15:49)  BSA (m2): 2.15 (09-05 @ 15:)  CAPILLARY BLOOD GLUCOSE      POCT Blood Glucose.: 114 mg/dL (06 Sep 2022 11:48)  POCT Blood Glucose.: 138 mg/dL (06 Sep 2022 06:49)  POCT Blood Glucose.: 168 mg/dL (05 Sep 2022 21:31)          PHYSICAL EXAM:  General: NAD, AAOx3  HEENT: atraumatic, normocephalic  Pulmonary: clear to auscultation bilaterally; No wheeze  Cardiovascular: Regular rate and rhythm; no murmurs, rubs or gallops. Normal S1S2  Gastrointestinal: firm, nontender, +distended; bowel sounds present, tympanitic abdomen  Musculoskeletal: 2+ peripheral pulses, no clubbing or cyanosis +Edema b/l LE  Neurology: Pt. alert and oriented, fluent speech, able to move all extremities  Skin: Multiple darkened skin nodules       LABS:      140  |  107  |  90<H>  ----------------------------<  123<H>  4.8   |  15<L>  |  13.51<H>    Ca    6.3<LL>      06 Sep 2022 05:30  Phos  7.9       Mg     2.5         TPro  5.9<L>  /  Alb  2.6<L>  /  TBili  0.4  /  DBili      /  AST  40  /  ALT  38  /  AlkPhos  293<H>      Creatinine Trend: 13.51 <--, 13.27 <--                        9.2    5.43  )-----------( 105      ( 06 Sep 2022 05:30 )             27.9     Urine Studies:  Urinalysis Basic - ( 05 Sep 2022 18:40 )    Color: Yellow / Appearance: Clear / S.020 / pH:   Gluc:  / Ketone: NEGATIVE  / Bili: Negative / Urobili: 0.2 E.U./dL   Blood:  / Protein: 100 mg/dL / Nitrite: NEGATIVE   Leuk Esterase: NEGATIVE / RBC: 5-10 /HPF / WBC < 5 /HPF   Sq Epi:  / Non Sq Epi: 0-5 /HPF / Bacteria: Present /HPF      Sodium, Random Urine: 111 mmol/L ( @ 19:53)  Osmolality, Random Urine: 335 mosm/kg ( @ 19:53)  Creatinine, Random Urine: 45 mg/dL ( @ 19:53)  Chloride, Random Urine: 62 mmol/L ( @ 18:40)  Creatinine, Random Urine: 79 mg/dL ( @ 18:40)  Osmolality, Random Urine: 352 mosm/kg ( @ 18:40)  Potassium, Random Urine: 24 mmol/L ( @ 18:40)  Sodium, Random Urine: 83 mmol/L ( @ 18:40)            
    Patient is a 57y old  Male who presents with a chief complaint of Hypertensive urgency (06 Sep 2022 09:17)        HPI:  56 yo M with HTN, DM, CKD, HCV presented w/ sob, abdominal distension, and LE edema. Over the past 2 weeks he has noticed his abdomen expanding and increased LE edema. Over the past week he has become more dyspneic with exertion. Four days prior to admission, patient also fell on his left side as a result of the increased edema. He follows outpatient nephrology with Dr. Almanza as outpatient. He has discussed dialysis with his nephrologist, but was told he did not require dialysis on his last vitis. Patient also reports history of HCV. He reportedly was treated in the past. He follows with GI for HCV, and per patient was still positive for HCV on his last visit 2-3 months prior. Patient reports he is not always compliant with home medications. Denies fevers, headaches, change in vision, chest pain, n/v/d.    ED Course:   VITAL SIGNS: Last 24 Hours  T(F): 98.5 (05 Sep 2022 15:49), Max: 98.5 (05 Sep 2022 15:49)  HR: 72 (05 Sep 2022 19:45) (72 - 80)  BP: 130/87 (05 Sep 2022 19:45) (130/87 - 177/116)  RR: 18 (05 Sep 2022 19:45) (18 - 20)  SpO2: 97% (05 Sep 2022 19:45) (94% - 97%)    Labs:  WBC 6.95, Hgb 9.8, Plt 127  Na 144, K 4.5, BUN 97, Cr 13.27, Alk Phos 345, Trop 0.29, BNP 30,151  EKG: NSR, PVC, no ST deviations    Imaging:  CXR: No consolidations or signs of PNX    Interventions:   Amlodipine 5mg, Coreg 12.5, lasix 40mg, losartan 100mg   (05 Sep 2022 19:50)      PAST MEDICAL & SURGICAL HISTORY:  Hypertension      Diabetes mellitus      Chronic kidney disease, unspecified CKD stage          MEDICATIONS  (STANDING):  calcitriol   Capsule 0.25 MICROGram(s) Oral daily  dextrose 5%. 1000 milliLiter(s) (100 mL/Hr) IV Continuous <Continuous>  dextrose 5%. 1000 milliLiter(s) (50 mL/Hr) IV Continuous <Continuous>  dextrose 50% Injectable 25 Gram(s) IV Push once  dextrose 50% Injectable 12.5 Gram(s) IV Push once  dextrose 50% Injectable 25 Gram(s) IV Push once  furosemide   Injectable 40 milliGRAM(s) IV Push every 12 hours  glucagon  Injectable 1 milliGRAM(s) IntraMuscular once  heparin   Injectable 5000 Unit(s) SubCutaneous every 8 hours  influenza   Vaccine 0.5 milliLiter(s) IntraMuscular once  insulin lispro (ADMELOG) corrective regimen sliding scale   SubCutaneous Before meals and at bedtime  sodium bicarbonate 650 milliGRAM(s) Oral every 12 hours    MEDICATIONS  (PRN):  acetaminophen     Tablet .. 650 milliGRAM(s) Oral every 6 hours PRN Temp greater or equal to 38C (100.4F), Mild Pain (1 - 3)  aluminum hydroxide/magnesium hydroxide/simethicone Suspension 30 milliLiter(s) Oral every 4 hours PRN Dyspepsia  dextrose Oral Gel 15 Gram(s) Oral once PRN Blood Glucose LESS THAN 70 milliGRAM(s)/deciliter  melatonin 3 milliGRAM(s) Oral at bedtime PRN Insomnia  ondansetron Injectable 4 milliGRAM(s) IV Push every 8 hours PRN Nausea and/or Vomiting           FAMILY HISTORY:    CBC Full  -  ( 06 Sep 2022 05:30 )  WBC Count : 5.43 K/uL  RBC Count : 3.27 M/uL  Hemoglobin : 9.2 g/dL  Hematocrit : 27.9 %  Platelet Count - Automated : 105 K/uL  Mean Cell Volume : 85.3 fl  Mean Cell Hemoglobin : 28.1 pg  Mean Cell Hemoglobin Concentration : 33.0 gm/dL  Auto Neutrophil # : x  Auto Lymphocyte # : x  Auto Monocyte # : x  Auto Eosinophil # : x  Auto Basophil # : x  Auto Neutrophil % : x  Auto Lymphocyte % : x  Auto Monocyte % : x  Auto Eosinophil % : x  Auto Basophil % : x          140  |  107  |  90<H>  ----------------------------<  123<H>  4.8   |  15<L>  |  13.51<H>    Ca    6.3<LL>      06 Sep 2022 05:30  Phos  7.9     -  Mg     2.5         TPro  5.9<L>  /  Alb  2.6<L>  /  TBili  0.4  /  DBili  x   /  AST  40  /  ALT  38  /  AlkPhos  293<H>        Urinalysis Basic - ( 05 Sep 2022 18:40 )    Color: Yellow / Appearance: Clear / S.020 / pH: x  Gluc: x / Ketone: NEGATIVE  / Bili: Negative / Urobili: 0.2 E.U./dL   Blood: x / Protein: 100 mg/dL / Nitrite: NEGATIVE   Leuk Esterase: NEGATIVE / RBC: 5-10 /HPF / WBC < 5 /HPF   Sq Epi: x / Non Sq Epi: 0-5 /HPF / Bacteria: Present /HPF          Radiology :  < from: Xray Chest 2 Views PA/Lat (22 @ 16:35) >  ACC: [id number] EXAM:  XR CHEST PA LAT 2V                          PROCEDURE DATE:  2022          INTERPRETATION:  CLINICAL INDICATION: Chest pain, shortness of breath    TECHNIQUE: Frontal and lateral chest radiographs on 2022 4:35 PM    COMPARISON: None.    FINDINGS:  Bibasilar atelectasis with band like opacification in the left midlung.   Remainder of lungs are clear. No pleural effusion or pneumothorax. The   cardiomediastinal silhouette is within normal limits. Left hemidiaphragm   is elevated with air-filled loop of large bowel, remainder of abdomen   unremarkable. Osseous structures are intact with degenerative changes of   the spine noted.    IMPRESSION:  Bibasilar atelectasis with bandlike opacification in the left midlung  compatible with minimal linear atelectasis in setting of elevated left   hemidiaphragm, which contains an air filled loop of bowel. No other   opacification, effusion, or pneumothorax.                      Vital Signs Last 24 Hrs  T(C): 36.4 (06 Sep 2022 11:45), Max: 36.9 (05 Sep 2022 15:49)  T(F): 97.6 (06 Sep 2022 11:45), Max: 98.5 (05 Sep 2022 15:49)  HR: 65 (06 Sep 2022 11:45) (65 - 80)  BP: 148/95 (06 Sep 2022 11:45) (111/75 - 177/116)  BP(mean): 107 (06 Sep 2022 10:15) (86 - 115)  RR: 18 (06 Sep 2022 11:45) (16 - 20)  SpO2: 96% (06 Sep 2022 11:45) (94% - 100%)    Parameters below as of 06 Sep 2022 11:45  Patient On (Oxygen Delivery Method): room air            REVIEW OF SYSTEMS:    per HPI          Physical Exam:  57 y o man lying in semi Wallace's position , awake , alert , no complaints     Head : normocephalic , atraumatic    Eyes : PERRLA , EOMI , no nystagmus , sclera anicteric    ENT : nasal discharge , uvula midline , no oropharyngeal erythema / exudate    Neck : supple , negative JVD , negative carotid bruits , no thyromegaly    Chest : CTA bilaterally , neg wheeze / rhonchi / rales / crackles / egophany    Cardiovascular: regular rate and rhythm , neg murmurs / rubs / gallops    Abdomen : soft , non distended , non tender to palpation in all 4 quadrants , negative rebound / guarding , normal bowel sounds    Extremities : WWP , neg cyanosis /clubbing / edema     Neurologic Exam:    Alert and oriented x 3     Motor Exam:    Right UE:                 : 5/5  wrist extensors/ flexors: 5/5  biceps :   5/5                    triceps :  5/5  deltoid :  5/5       Left UE:                  : 5/5  wrist extensors/ flexors : 5/5  biceps :   5/5                    triceps :  5/5  deltoid :  5/5      Right LE:                  dorsiflexors :  5/5  plantar flexors :  5/5  quadriceps :  5/5  hamstrings :  5/5  hip flexors :  5/5    Left LE:                      dorsiflexors :  5/5  plantar flexors :  5/5  quadriceps :  5/5  hamstrings :  5/5  hip flexors :  5/5        Sensation:         intact to light touch x 4 extremities                         DTR :                     biceps/brachioradialis : equal                                              patella/ankle : equal   Gait :  not tested              PM&R Impression :     1) admitted for hypertensive urgency      Recommendations / Plan :     1) Physical / Occupational therapy focusing on therapeutic exercises , equipment evaluation , bed mobility/transfer out of bed evaluation , progressive ambulation with assistive devices prn .    2) Anticipated Disposition Plan / Recs  :   d/c home with no post discharge rehab needs 
GASTROENTEROLOGY CONSULT NOTE  HPI:  56 yo M with HTN, DM, CKD, HCV presented w/ sob, abdominal distension, and LE edema. Over the past 2 weeks he has noticed his abdomen expanding and increased LE edema. Over the past week he has become more dyspneic with exertion. Four days prior to admission, patient also fell on his left side as a result of the increased edema. He follows outpatient nephrology with Dr. Almanza as outpatient. He has discussed dialysis with his nephrologist, but was told he did not require dialysis on his last vitis. Patient also reports history of HCV. He reportedly was treated in the past. He follows with GI for HCV, and per patient was still positive for HCV on his last visit 2-3 months prior. Patient reports he is not always compliant with home medications. Denies fevers, headaches, change in vision, chest pain, n/v/d.    ED Course:   VITAL SIGNS: Last 24 Hours  T(F): 98.5 (05 Sep 2022 15:49), Max: 98.5 (05 Sep 2022 15:49)  HR: 72 (05 Sep 2022 19:45) (72 - 80)  BP: 130/87 (05 Sep 2022 19:45) (130/87 - 177/116)  RR: 18 (05 Sep 2022 19:45) (18 - 20)  SpO2: 97% (05 Sep 2022 19:45) (94% - 97%)    Labs:  WBC 6.95, Hgb 9.8, Plt 127  Na 144, K 4.5, BUN 97, Cr 13.27, Alk Phos 345, Trop 0.29, BNP 30,151  EKG: NSR, PVC, no ST deviations    Imaging:  CXR: No consolidations or signs of PNX    Interventions:   Amlodipine 5mg, Coreg 12.5, lasix 40mg, losartan 100mg   (05 Sep 2022 19:50)    GI consulted for abd distension. Patient seen and examined at bedside.     Allergies    penicillin (Rash)    Intolerances      Home Medications:  calcitriol 0.25 mcg oral capsule: 1 cap(s) orally once a day (05 Sep 2022 19:54)  hydroCHLOROthiazide 12.5 mg oral capsule: 1 cap(s) orally once a day (05 Sep 2022 19:55)  losartan 50 mg oral tablet: 1 tab(s) orally once a day (05 Sep 2022 19:55)    MEDICATIONS:  MEDICATIONS  (STANDING):  calcitriol   Capsule 0.25 MICROGram(s) Oral daily  dextrose 5%. 1000 milliLiter(s) (100 mL/Hr) IV Continuous <Continuous>  dextrose 5%. 1000 milliLiter(s) (50 mL/Hr) IV Continuous <Continuous>  dextrose 50% Injectable 25 Gram(s) IV Push once  dextrose 50% Injectable 12.5 Gram(s) IV Push once  dextrose 50% Injectable 25 Gram(s) IV Push once  furosemide   Injectable 40 milliGRAM(s) IV Push every 12 hours  glucagon  Injectable 1 milliGRAM(s) IntraMuscular once  heparin   Injectable 5000 Unit(s) SubCutaneous every 8 hours  influenza   Vaccine 0.5 milliLiter(s) IntraMuscular once  insulin lispro (ADMELOG) corrective regimen sliding scale   SubCutaneous Before meals and at bedtime  sodium bicarbonate 650 milliGRAM(s) Oral every 12 hours    MEDICATIONS  (PRN):  acetaminophen     Tablet .. 650 milliGRAM(s) Oral every 6 hours PRN Temp greater or equal to 38C (100.4F), Mild Pain (1 - 3)  aluminum hydroxide/magnesium hydroxide/simethicone Suspension 30 milliLiter(s) Oral every 4 hours PRN Dyspepsia  dextrose Oral Gel 15 Gram(s) Oral once PRN Blood Glucose LESS THAN 70 milliGRAM(s)/deciliter  melatonin 3 milliGRAM(s) Oral at bedtime PRN Insomnia  ondansetron Injectable 4 milliGRAM(s) IV Push every 8 hours PRN Nausea and/or Vomiting    PAST MEDICAL & SURGICAL HISTORY:  Hypertension      Diabetes mellitus      Chronic kidney disease, unspecified CKD stage        FAMILY HISTORY:    SOCIAL HISTORY:  Tobacco: [ ] Current, [ ] Former, [ ] Never; Pack Years:  Alcohol:  Illicit Drugs:    REVIEW OF SYSTEMS:  CONSTITUTIONAL: No weakness, fevers or chills  HEENT: No visual changes; No vertigo or throat pain   NECK: No pain or stiffness  RESPIRATORY: No cough, wheezing, hemoptysis; No shortness of breath  CARDIOVASCULAR: No chest pain or palpitations  GASTROINTESTINAL: As above.  GENITOURINARY: No dysuria, frequency or hematuria  NEUROLOGICAL: No numbness or weakness  SKIN: No itching, burning, rashes, or lesions   All other 10 review of systems is negative unless indicated above.    Vital Signs Last 24 Hrs  T(C): 36.4 (06 Sep 2022 15:29), Max: 36.6 (05 Sep 2022 22:09)  T(F): 97.6 (06 Sep 2022 15:29), Max: 97.8 (05 Sep 2022 22:09)  HR: 72 (06 Sep 2022 15:29) (65 - 78)  BP: 163/100 (06 Sep 2022 15:29) (111/75 - 177/116)  BP(mean): 107 (06 Sep 2022 10:15) (86 - 115)  RR: 18 (06 Sep 2022 15:29) (16 - 18)  SpO2: 96% (06 Sep 2022 15:29) (94% - 100%)    Parameters below as of 06 Sep 2022 15:29  Patient On (Oxygen Delivery Method): room air        09-05 @ 07:01  -  09-06 @ 07:00  --------------------------------------------------------  IN: 100 mL / OUT: 580 mL / NET: -480 mL    09-06 @ 07:01  -  09-06 @ 17:23  --------------------------------------------------------  IN: 100 mL / OUT: 150 mL / NET: -50 mL        PHYSICAL EXAM:    General: in no acute distress  Eyes: Anicteric sclerae, moist conjunctivae  HENT: Moist mucous membranes  Neck: Trachea midline, supple  Lungs: Normal respiratory effort, no intercostal retractions  Cardiovascular: RRR  Abdomen: Soft, non-tender +distended and tympanitic; No rebound or guarding  Extremities: Normal range of motion, No clubbing, cyanosis or edema  Neurological: Alert and oriented x3  Skin: Warm and dry. No obvious rash    LABS:                        9.2    5.43  )-----------( 105      ( 06 Sep 2022 05:30 )             27.9     09-06    140  |  107  |  90<H>  ----------------------------<  123<H>  4.8   |  15<L>  |  13.51<H>    Ca    6.3<LL>      06 Sep 2022 05:30  Phos  7.9     09-06  Mg     2.5     09-06    TPro  5.9<L>  /  Alb  2.6<L>  /  TBili  0.4  /  DBili  x   /  AST  40  /  ALT  38  /  AlkPhos  293<H>  09-06            Urinalysis with Rflx Culture (collected 05 Sep 2022 18:40)    Urinalysis with Rflx Culture (collected 05 Sep 2022 16:42)      RADIOLOGY & ADDITIONAL STUDIES:     Reviewed
56 yo M with HTN, DM, CKD (baseline Cr. ~10) , HCV, COVID presented w/ sob, abdominal distension, and LE edema. Over the past 2 weeks he has noticed his abdomen expanding and increased LE edema. Over the past week he has become more dyspneic with exertion.. He follows outpatient nephrology with Dr. Almanza as outpatient. Patient was found to have elevated Cr of 13 and oliguric. IR consulted for tunneled HD catheter placement.     Clinical History: ACUTE RENAL FAILURE    Handoff    MEWS Score    Hypertension    Diabetes mellitus    Chronic kidney disease, unspecified CKD stage    Acute renal failure    Hypertensive urgency    Acute kidney injury superimposed on CKD    Fall    Diabetes mellitus    Anemia    Elevated troponin    Prophylactic measure    High anion gap metabolic acidosis    Hepatitis C    SOB    Hypertension    [email or web address]        Meds:acetaminophen     Tablet .. 650 milliGRAM(s) Oral every 6 hours PRN  aluminum hydroxide/magnesium hydroxide/simethicone Suspension 30 milliLiter(s) Oral every 4 hours PRN  calcitriol   Capsule 0.25 MICROGram(s) Oral daily  dextrose 5%. 1000 milliLiter(s) IV Continuous <Continuous>  dextrose 5%. 1000 milliLiter(s) IV Continuous <Continuous>  dextrose 50% Injectable 25 Gram(s) IV Push once  dextrose 50% Injectable 12.5 Gram(s) IV Push once  dextrose 50% Injectable 25 Gram(s) IV Push once  dextrose Oral Gel 15 Gram(s) Oral once PRN  furosemide   Injectable 40 milliGRAM(s) IV Push every 12 hours  glucagon  Injectable 1 milliGRAM(s) IntraMuscular once  heparin   Injectable 5000 Unit(s) SubCutaneous every 8 hours  influenza   Vaccine 0.5 milliLiter(s) IntraMuscular once  insulin lispro (ADMELOG) corrective regimen sliding scale   SubCutaneous Before meals and at bedtime  melatonin 3 milliGRAM(s) Oral at bedtime PRN  ondansetron Injectable 4 milliGRAM(s) IV Push every 8 hours PRN  sodium bicarbonate 650 milliGRAM(s) Oral every 12 hours      Allergies:penicillin (Rash)        Labs:                           9.2    5.43  )-----------( 105      ( 06 Sep 2022 05:30 )             27.9       09-06    140  |  107  |  90<H>  ----------------------------<  123<H>  4.8   |  15<L>  |  13.51<H>    Ca    6.3<LL>      06 Sep 2022 05:30  Phos  7.9     09-06  Mg     2.5     09-06    TPro  5.9<L>  /  Alb  2.6<L>  /  TBili  0.4  /  DBili  x   /  AST  40  /  ALT  38  /  AlkPhos  293<H>  09-06          
HPI:  58yo M w/ a PMHx of HTN, DM, CKD presented w/ sob, abdominal distension, and LE edema x1wk. Patient reports he is not always compliant with home medications. Patient also states he fell on to his right side about 2 weeks ago. Patient is not on HD and follows outpatient w/ Dr. Almanza (nephrology). Denies fevers, headaches, change in vision, chest pain, n/v/d.    ED Course:   T 98.5 H 80, /106, R 20, SpO2 97% on RA  WBC 6.95, Hgb 9.8, Plt 127  Na 144, K 4.5, BUN 97, Cr 13.27, Alk Phos 345, Trop 0.29, BNP 30,151  EKG: NSR, PVC, no ST deviations  CXR: No consolidations or signs of PNX  Amlodipine 5mg, Coreg 12.5, lasix 40mg, losartan 100mg      REVIEW OF SYSTEMS: see HPI    PAST MEDICAL & SURGICAL HISTORY:      FAMILY HISTORY:  -DM, HT, HLD    SOCIAL HISTORY:  Tobacco use: No  EtOH use: Social  Illicit drug use: No    MEDICATIONS:  MEDICATIONS  (STANDING):  carvedilol 12.5 milliGRAM(s) Oral every 12 hours  losartan 100 milliGRAM(s) Oral daily    MEDICATIONS  (PRN):  acetaminophen     Tablet .. 650 milliGRAM(s) Oral every 6 hours PRN Temp greater or equal to 38C (100.4F), Mild Pain (1 - 3)  aluminum hydroxide/magnesium hydroxide/simethicone Suspension 30 milliLiter(s) Oral every 4 hours PRN Dyspepsia  melatonin 3 milliGRAM(s) Oral at bedtime PRN Insomnia  ondansetron Injectable 4 milliGRAM(s) IV Push every 8 hours PRN Nausea and/or Vomiting      ALLERGIES:  Allergies    penicillin (Rash)    Intolerances        VITAL SIGNS:  Vital Signs Last 24 Hrs  T(C): 36.9 (05 Sep 2022 15:49), Max: 36.9 (05 Sep 2022 15:49)  T(F): 98.5 (05 Sep 2022 15:49), Max: 98.5 (05 Sep 2022 15:49)  HR: 78 (05 Sep 2022 18:44) (78 - 80)  BP: 177/116 (05 Sep 2022 18:44) (171/106 - 177/116)  BP(mean): --  RR: 18 (05 Sep 2022 18:44) (18 - 20)  SpO2: 95% (05 Sep 2022 18:44) (95% - 97%)    Parameters below as of 05 Sep 2022 18:44  Patient On (Oxygen Delivery Method): room air          PHYSICAL EXAM:  Gen: NAD, laying in bed, well appearing, alert, interactive  HEENT: PERRL, anicteric sclera, no JVD, no thyromegaly  Cardio: +S1/S2, RRR, no murmurs  Resp: CTA b/l, no w/r/r  GI: +BS x4, distended, nontender  Ext: 2+ peripheral edema, NROM x4  Vasc: 3+ peripheral pulses  Skin: warm, dry, and intact. no rashes, wounds or scars  Neuro: AAOx3, no focal deficits     LABS:                        9.8    6.95  )-----------( 127      ( 05 Sep 2022 16:50 )             29.4         144  |  107  |  97<H>  ----------------------------<  103<H>  4.5   |  17<L>  |  13.27<H>    Ca    6.8<L>      05 Sep 2022 16:50    TPro  7.1  /  Alb  3.2<L>  /  TBili  0.6  /  DBili  x   /  AST  46<H>  /  ALT  44  /  AlkPhos  345<H>        Urinalysis Basic - ( 05 Sep 2022 18:40 )    Color: Yellow / Appearance: Clear / S.020 / pH: x  Gluc: x / Ketone: NEGATIVE  / Bili: Negative / Urobili: 0.2 E.U./dL   Blood: x / Protein: 100 mg/dL / Nitrite: NEGATIVE   Leuk Esterase: NEGATIVE / RBC: 5-10 /HPF / WBC < 5 /HPF   Sq Epi: x / Non Sq Epi: 0-5 /HPF / Bacteria: Present /HPF      CARDIAC MARKERS ( 05 Sep 2022 16:50 )  x     / 0.29 ng/mL / x     / x     / x          CAPILLARY BLOOD GLUCOSE          Serum Pro-Brain Natriuretic Peptide: 10101 pg/mL (22 @ 16:50)      RADIOLOGY & ADDITIONAL TESTS: Reviewed.
LENGTH OF HOSPITAL STAY: 5d    CHIEF COMPLAINT:   Patient is a 57y old  Male who presents with a chief complaint of Hypertensive urgency (09 Sep 2022 20:45)    HISTORY OF PRESENTING ILLNESS:   58 yo M with HTN, DM, CKD, HCV presented w/ sob, abdominal distension, and LE edema. Over the past 2 weeks he has noticed his abdomen expanding and increased LE edema. Over the past week he has become more dyspneic with exertion. Four days prior to admission, patient also fell on his left side as a result of the increased edema. He follows outpatient nephrology with Dr. Almanza as outpatient. He has discussed dialysis with his nephrologist, but was told he did not require dialysis on his last vitis. Patient also reports history of HCV. He reportedly was treated in the past. He follows with GI for HCV, and per patient was still positive for HCV on his last visit 2-3 months prior. Patient reports he is not always compliant with home medications. Denies fevers, headaches, change in vision, chest pain, n/v/d.    ED Course:   VITAL SIGNS: Last 24 Hours  T(F): 98.5 (05 Sep 2022 15:49), Max: 98.5 (05 Sep 2022 15:49)  HR: 72 (05 Sep 2022 19:45) (72 - 80)  BP: 130/87 (05 Sep 2022 19:45) (130/87 - 177/116)  RR: 18 (05 Sep 2022 19:45) (18 - 20)  SpO2: 97% (05 Sep 2022 19:45) (94% - 97%)    Labs:  WBC 6.95, Hgb 9.8, Plt 127  Na 144, K 4.5, BUN 97, Cr 13.27, Alk Phos 345, Trop 0.29, BNP 30,151  EKG: NSR, PVC, no ST deviations    Imaging:  CXR: No consolidations or signs of PNX    Interventions:   Amlodipine 5mg, Coreg 12.5, lasix 40mg, losartan 100mg   (05 Sep 2022 19:50)    PAST MEDICAL & SURGICAL HISTORY:  Hypertension  Diabetes mellitus  Chronic kidney disease, unspecified CKD stage    SOCIAL HISTORY:    ALLERGIES:  penicillin (Rash)    MEDICATIONS:  STANDING MEDICATIONS  calcitriol   Capsule 0.5 MICROGram(s) Oral daily  calcium acetate 1334 milliGRAM(s) Oral three times a day with meals  dextrose 5%. 1000 milliLiter(s) IV Continuous <Continuous>  dextrose 5%. 1000 milliLiter(s) IV Continuous <Continuous>  dextrose 50% Injectable 25 Gram(s) IV Push once  dextrose 50% Injectable 12.5 Gram(s) IV Push once  dextrose 50% Injectable 25 Gram(s) IV Push once  glucagon  Injectable 1 milliGRAM(s) IntraMuscular once  influenza   Vaccine 0.5 milliLiter(s) IntraMuscular once  insulin lispro (ADMELOG) corrective regimen sliding scale   SubCutaneous Before meals and at bedtime  losartan 50 milliGRAM(s) Oral every 24 hours  metoprolol succinate ER 25 milliGRAM(s) Oral every 24 hours  polyethylene glycol 3350 17 Gram(s) Oral daily  senna 2 Tablet(s) Oral at bedtime    PRN MEDICATIONS  acetaminophen     Tablet .. 650 milliGRAM(s) Oral every 6 hours PRN  dextrose Oral Gel 15 Gram(s) Oral once PRN  melatonin 3 milliGRAM(s) Oral at bedtime PRN  ondansetron Injectable 4 milliGRAM(s) IV Push every 8 hours PRN    VITALS:   T(F): 97.7  HR: 69  BP: 160/97  RR: 18  SpO2: 95%    LABS:                        10.0   8.62  )-----------( 50       ( 10 Sep 2022 06:46 )             29.6     09-10    135  |  98  |  41<H>  ----------------------------<  93  4.0   |  26  |  6.92<H>    Ca    7.7<L>      10 Sep 2022 06:46    PT/INR - ( 09 Sep 2022 20:13 )   PT: 11.8 sec;   INR: 0.99       PTT - ( 09 Sep 2022 20:13 )  PTT:31.1 sec    Culture - Body Fluid with Gram Stain (collected 07 Sep 2022 13:12)  Source: Peritoneal Peritoneal Fluid  Gram Stain (07 Sep 2022 16:25):    No organisms seen    Rare WBC's  Preliminary Report (08 Sep 2022 09:14):    No growth to date    RADIOLOGY:  < from: US Abdomen Limited (09.06.22 @ 18:35) >  Liver: Shrunken liver with coarsened echotexture and nodular contour. No   focal lesion. The visualized segments of the hepatic and portal veins are   patent and demonstrate normal direction of flow.  Bile ducts: Normal caliber. Common bile duct measures 0.3 cm.  Gallbladder: Contracted gallbladder. Thickened gallbladder wall (0.8 cm),   could be secondary to ascites or contraction. No cholelithiasis. No   pericholecystic fluid. Negative sonographic Lofton sign.  Pancreas: Not visualized.  Right kidney: 8.4 cm. No hydronephrosis. Increased renal cortical   echogenicity. Normal renal cortical thickness.  Ascites: Large ascites.  Aorta and IVC: Visualized portions are within normal limits.  Other: Right pleural effusion.    IMPRESSION:  1. No hydronephrosis.    2. Increased renal cortical echogenicity consistent with medical renal   disease.    3. Cirrhotic liver.    4. Large ascites.    < end of copied text >    < from: CT Abdomen No Cont (09.06.22 @ 17:50) >  1.Hepatic cirrhosis.  2.  Large ascites.  3.  Small bilateral pleural effusions.  4.  Anasarca.  5.  Small kidneys bilaterally.    < end of copied text >    < from: Xray Chest 2 Views PA/Lat (09.05.22 @ 16:35) >  IMPRESSION:  Bibasilar atelectasis with bandlike opacification in the left midlung  compatible with minimal linear atelectasis in setting of elevated left   hemidiaphragm, which contains an air filled loop of bowel. No other   opacification, effusion, or pneumothorax.    < end of copied text >    PHYSICAL EXAM:  Constitutional: WDWN resting comfortably in bed; NAD  Head: NC/AT  Eyes: PERRL, EOMI, anicteric sclera  ENT: no nasal discharge; uvula midline, no oropharyngeal erythema or exudates; MMM  Neck: supple; no JVD or thyromegaly  Respiratory: CTA B/L; no W/R/R, no retractions  Cardiac: +S1/S2; RRR; no M/R/G; PMI non-displaced  Gastrointestinal: soft, NT/ND; no rebound or guarding; +BSx4  Back: spine midline, no bony tenderness or step-offs; no CVAT B/L  Extremities: WWP, no clubbing or cyanosis; no peripheral edema. Capillary refill <2 sec  Musculoskeletal: NROM x4; no joint swelling, tenderness or erythema  Vascular: 2+ radial, femoral, DP/PT pulses B/L  Dermatologic: skin warm, dry and intact; no rashes, wounds, or scars  Lymphatic: no submandibular or cervical LAD  Neurologic: AAOx3; CNII-XII grossly intact; no focal deficits  Psychiatric: affect and characteristics of appearance, verbalizations, behaviors are appropriate    
56 yo M with HTN, DM, CKD (baseline Cr. ~10) , HCV, COVID  presented w/ sob, abdominal distension, and LE edema. Over the past 2 weeks he has noticed his abdomen expanding and increased LE edema. Over the past week he has become more dyspneic with exertion. IR consulted for paracentesis.     Clinical History: ACUTE RENAL FAILURE    Handoff    MEWS Score    Hypertension    Diabetes mellitus    Chronic kidney disease, unspecified CKD stage    Acute renal failure    Hypertensive urgency    Acute kidney injury superimposed on CKD    Fall    Diabetes mellitus    Anemia    Elevated troponin    Prophylactic measure    High anion gap metabolic acidosis    Hepatitis C    Decompensated hepatic cirrhosis    ESRD (end stage renal disease)    SOB    Hypertension    [email or web address]        Meds:acetaminophen     Tablet .. 650 milliGRAM(s) Oral every 6 hours PRN  aluminum hydroxide/magnesium hydroxide/simethicone Suspension 30 milliLiter(s) Oral every 4 hours PRN  amLODIPine   Tablet 10 milliGRAM(s) Oral every 24 hours  calcitriol   Capsule 0.5 MICROGram(s) Oral daily  calcium acetate 1334 milliGRAM(s) Oral two times a day with meals  dextrose 5%. 1000 milliLiter(s) IV Continuous <Continuous>  dextrose 5%. 1000 milliLiter(s) IV Continuous <Continuous>  dextrose 50% Injectable 25 Gram(s) IV Push once  dextrose 50% Injectable 12.5 Gram(s) IV Push once  dextrose 50% Injectable 25 Gram(s) IV Push once  dextrose Oral Gel 15 Gram(s) Oral once PRN  furosemide   Injectable 40 milliGRAM(s) IV Push every 12 hours  glucagon  Injectable 1 milliGRAM(s) IntraMuscular once  influenza   Vaccine 0.5 milliLiter(s) IntraMuscular once  insulin lispro (ADMELOG) corrective regimen sliding scale   SubCutaneous Before meals and at bedtime  melatonin 3 milliGRAM(s) Oral at bedtime PRN  ondansetron Injectable 4 milliGRAM(s) IV Push every 8 hours PRN  polyethylene glycol 3350 17 Gram(s) Oral daily  senna 2 Tablet(s) Oral at bedtime  sodium bicarbonate 650 milliGRAM(s) Oral every 12 hours      Allergies:penicillin (Rash)        Labs:                           10.4   6.43  )-----------( 107      ( 07 Sep 2022 07:00 )             30.6     PT/INR - ( 07 Sep 2022 07:00 )   PT: 11.6 sec;   INR: 0.98          PTT - ( 07 Sep 2022 07:00 )  PTT:30.2 sec  09-07    139  |  105  |  100<H>  ----------------------------<  93  4.8   |  16<L>  |  13.19<H>    Ca    6.7<L>      07 Sep 2022 07:00  Phos  8.1     09-07  Mg     2.3     09-07    TPro  6.4  /  Alb  2.7<L>  /  TBili  0.5  /  DBili  x   /  AST  44<H>  /  ALT  38  /  AlkPhos  318<H>  09-07          Imaging Findings: CT and US reviewed, ascites

## 2022-09-12 LAB — CRYOGLOB SERPL-MCNC: NEGATIVE — SIGNIFICANT CHANGE UP

## 2022-09-13 LAB
CULTURE RESULTS: NO GROWTH — SIGNIFICANT CHANGE UP
SPECIMEN SOURCE: SIGNIFICANT CHANGE UP

## 2022-09-14 DIAGNOSIS — K56.7 ILEUS, UNSPECIFIED: ICD-10-CM

## 2022-09-14 DIAGNOSIS — R18.8 OTHER ASCITES: ICD-10-CM

## 2022-09-14 DIAGNOSIS — Z88.0 ALLERGY STATUS TO PENICILLIN: ICD-10-CM

## 2022-09-14 DIAGNOSIS — R74.8 ABNORMAL LEVELS OF OTHER SERUM ENZYMES: ICD-10-CM

## 2022-09-14 DIAGNOSIS — E83.51 HYPOCALCEMIA: ICD-10-CM

## 2022-09-14 DIAGNOSIS — D63.1 ANEMIA IN CHRONIC KIDNEY DISEASE: ICD-10-CM

## 2022-09-14 DIAGNOSIS — I16.0 HYPERTENSIVE URGENCY: ICD-10-CM

## 2022-09-14 DIAGNOSIS — N25.0 RENAL OSTEODYSTROPHY: ICD-10-CM

## 2022-09-14 DIAGNOSIS — K74.69 OTHER CIRRHOSIS OF LIVER: ICD-10-CM

## 2022-09-14 DIAGNOSIS — B19.20 UNSPECIFIED VIRAL HEPATITIS C WITHOUT HEPATIC COMA: ICD-10-CM

## 2022-09-14 DIAGNOSIS — N17.9 ACUTE KIDNEY FAILURE, UNSPECIFIED: ICD-10-CM

## 2022-09-14 DIAGNOSIS — N18.6 END STAGE RENAL DISEASE: ICD-10-CM

## 2022-09-14 DIAGNOSIS — E87.2 ACIDOSIS: ICD-10-CM

## 2022-09-14 DIAGNOSIS — I12.0 HYPERTENSIVE CHRONIC KIDNEY DISEASE WITH STAGE 5 CHRONIC KIDNEY DISEASE OR END STAGE RENAL DISEASE: ICD-10-CM

## 2022-09-14 DIAGNOSIS — Z91.14 PATIENT'S OTHER NONCOMPLIANCE WITH MEDICATION REGIMEN: ICD-10-CM

## 2022-09-14 DIAGNOSIS — D69.6 THROMBOCYTOPENIA, UNSPECIFIED: ICD-10-CM

## 2022-09-14 DIAGNOSIS — E11.22 TYPE 2 DIABETES MELLITUS WITH DIABETIC CHRONIC KIDNEY DISEASE: ICD-10-CM

## 2022-09-14 DIAGNOSIS — E83.39 OTHER DISORDERS OF PHOSPHORUS METABOLISM: ICD-10-CM

## 2022-09-14 DIAGNOSIS — Z86.16 PERSONAL HISTORY OF COVID-19: ICD-10-CM

## 2022-09-14 LAB — SRA INTERP SER-IMP: SIGNIFICANT CHANGE UP

## 2022-09-19 ENCOUNTER — APPOINTMENT (OUTPATIENT)
Dept: VASCULAR SURGERY | Facility: CLINIC | Age: 57
End: 2022-09-19

## 2022-09-19 ENCOUNTER — NON-APPOINTMENT (OUTPATIENT)
Age: 57
End: 2022-09-19

## 2022-09-19 NOTE — HISTORY OF PRESENT ILLNESS
[FreeTextEntry1] : 56 yo AA M with HTN, CKD referred by Dr. West to be evaluated for AVF creation due to impeding dialysis.

## 2022-09-19 NOTE — PHYSICAL EXAM
[Abdomen Tenderness] : ~T ~M No abdominal tenderness [de-identified] : WN/WD, NAD [de-identified] : NC/AT [de-identified] : supple [de-identified] : +FROM [de-identified] : grossly intact

## 2022-09-19 NOTE — ADDENDUM
[FreeTextEntry1] : I, Dr.Allan Reno, personally performed the evaluation and management (E/M) services for this new patient.  That E/M includes conducting the initial examination, assessing all conditions, and establishing the plan of care.  Today, my ACP, Eunice Brock PAC, was here to observe my evaluation and management services for this patient to be followed going forward.\par \par \par

## 2022-09-20 ENCOUNTER — APPOINTMENT (OUTPATIENT)
Dept: HEART AND VASCULAR | Facility: CLINIC | Age: 57
End: 2022-09-20

## 2022-09-22 ENCOUNTER — FORM ENCOUNTER (OUTPATIENT)
Age: 57
End: 2022-09-22

## 2022-10-11 ENCOUNTER — APPOINTMENT (OUTPATIENT)
Dept: HEPATOLOGY | Facility: CLINIC | Age: 57
End: 2022-10-11

## 2022-10-17 ENCOUNTER — APPOINTMENT (OUTPATIENT)
Dept: HEART AND VASCULAR | Facility: CLINIC | Age: 57
End: 2022-10-17

## 2022-10-17 VITALS
DIASTOLIC BLOOD PRESSURE: 110 MMHG | OXYGEN SATURATION: 99 % | TEMPERATURE: 98 F | SYSTOLIC BLOOD PRESSURE: 150 MMHG | HEIGHT: 71 IN | HEART RATE: 81 BPM | WEIGHT: 170 LBS | BODY MASS INDEX: 23.8 KG/M2

## 2022-10-17 DIAGNOSIS — R94.31 ABNORMAL ELECTROCARDIOGRAM [ECG] [EKG]: ICD-10-CM

## 2022-10-17 DIAGNOSIS — I10 ESSENTIAL (PRIMARY) HYPERTENSION: ICD-10-CM

## 2022-10-17 PROCEDURE — 99213 OFFICE O/P EST LOW 20 MIN: CPT

## 2022-10-17 RX ORDER — VALSARTAN 160 MG/1
160 TABLET ORAL DAILY
Refills: 0 | Status: ACTIVE | COMMUNITY

## 2022-10-17 RX ORDER — CHOLECALCIFEROL (VITAMIN D3) 25 MCG
TABLET ORAL DAILY
Refills: 0 | Status: ACTIVE | COMMUNITY

## 2022-10-17 RX ORDER — AMLODIPINE BESYLATE 5 MG/1
5 TABLET ORAL
Qty: 90 | Refills: 3 | Status: ACTIVE | COMMUNITY
Start: 2022-10-17 | End: 1900-01-01

## 2022-10-17 RX ORDER — CALCITRIOL 0.25 UG/1
0.25 CAPSULE, LIQUID FILLED ORAL
Refills: 0 | Status: ACTIVE | COMMUNITY

## 2022-10-17 NOTE — REASON FOR VISIT
[FreeTextEntry1] : 58 y/o AAM with HTN, CKD, here with markedly elevated BP. Pt had a colonoscopy several days ago and was told it was very high there as well.\par \par 3/18/22 Here for f/u, he stopped ALL pills, blaming all of them for his edema.  BP way up.  Echo today.  Pt told he is not permitted to stop a pill without speaking to me or Dr Sanchez.\par 10/17/22 puritis and rash.  On HD now.  Told to see Derm\par \par \par EKG: NSR with ST-Twave abnormalities. 2/11/22

## 2022-10-17 NOTE — ASSESSMENT
[FreeTextEntry1] : HTN- will add Amlodipine 5 mg, pt reports getting bad edema while on 10 mg. Another option is to give an alpha blocker at bedtime simce he has BPH with nocturia x 3.  Pt got edema and so he stopped all pills!!  Warned not to do that.  Echo reveals LVH and a dilated Ao root of 43 mm.  Pt told to resume Coreg 25 BID and I am starting Losartan-HCT 50-12.5.  Pt must return in 30 days and he has been told to call if stopping any meds.  His wife is in attendance today.  Meds changed.  Off Coreg, on Valsartan and HCTZ.  Also on Losartan, ?? will DC Losartan and start Amlodipine 5 mg.  Hopefully he will not get edema like he previously did since he is now on HD . \par \par CKD- Now on HD, labs from Sept done at [facility] reviewed.\par \par STTw abnormalities- will echo, stress testing deferred until BP is controlled\par \par ED- Pt told he will need a stress test prior to taking a Viagra type drug.

## 2022-11-16 ENCOUNTER — APPOINTMENT (OUTPATIENT)
Dept: HEART AND VASCULAR | Facility: CLINIC | Age: 57
End: 2022-11-16

## 2023-02-17 ENCOUNTER — OFFICE (OUTPATIENT)
Dept: URBAN - METROPOLITAN AREA CLINIC 30 | Facility: CLINIC | Age: 58
Setting detail: OPHTHALMOLOGY
End: 2023-02-17
Payer: COMMERCIAL

## 2023-02-17 DIAGNOSIS — H52.4: ICD-10-CM

## 2023-02-17 DIAGNOSIS — E13.9: ICD-10-CM

## 2023-02-17 DIAGNOSIS — H10.9: ICD-10-CM

## 2023-02-17 PROCEDURE — 92015 DETERMINE REFRACTIVE STATE: CPT | Performed by: OPHTHALMOLOGY

## 2023-02-17 PROCEDURE — 92004 COMPRE OPH EXAM NEW PT 1/>: CPT | Performed by: OPHTHALMOLOGY

## 2023-02-17 ASSESSMENT — REFRACTION_CURRENTRX
OD_ADD: +2.50
OS_ADD: +2.50
OS_CYLINDER: SPH
OD_CYLINDER: +0.50
OS_SPHERE: -4.00
OS_OVR_VA: 20/
OD_OVR_VA: 20/
OD_SPHERE: -4.75
OD_AXIS: 175

## 2023-02-17 ASSESSMENT — REFRACTION_MANIFEST
OS_AXIS: 030
OS_VA1: 20/25+2
OD_AXIS: 010
OD_VA1: 20/25+2
OS_SPHERE: -3.50
OD_CYLINDER: +0.50
OD_SPHERE: -4.00
OD_ADD: +2.50
OS_CYLINDER: +0.25
OU_VA: 20/25-1
OS_ADD: +2.50

## 2023-02-17 ASSESSMENT — SPHEQUIV_DERIVED
OD_SPHEQUIV: -3.75
OD_SPHEQUIV: -3.75
OS_SPHEQUIV: -3.375
OS_SPHEQUIV: -3.375

## 2023-02-17 ASSESSMENT — TONOMETRY
OS_IOP_MMHG: 17
OD_IOP_MMHG: 15

## 2023-02-17 ASSESSMENT — REFRACTION_AUTOREFRACTION
OD_AXIS: 010
OS_AXIS: 030
OD_SPHERE: -4.00
OS_SPHERE: -3.50
OS_CYLINDER: +0.25
OD_CYLINDER: +0.50

## 2023-02-17 ASSESSMENT — CONFRONTATIONAL VISUAL FIELD TEST (CVF)
OS_FINDINGS: FULL
OD_FINDINGS: FULL

## 2023-02-17 ASSESSMENT — VISUAL ACUITY
OD_BCVA: 20/30-1
OS_BCVA: 20/30+2

## 2023-03-09 ENCOUNTER — HOSPITAL ENCOUNTER (EMERGENCY)
Dept: HOSPITAL 74 - JER | Age: 58
Discharge: ANOTHER HOSPITAL | End: 2023-03-09
Payer: COMMERCIAL

## 2023-03-09 VITALS — TEMPERATURE: 98 F

## 2023-03-09 VITALS — BODY MASS INDEX: 23.6 KG/M2

## 2023-03-09 VITALS — SYSTOLIC BLOOD PRESSURE: 141 MMHG | RESPIRATION RATE: 20 BRPM | HEART RATE: 79 BPM | DIASTOLIC BLOOD PRESSURE: 100 MMHG

## 2023-03-09 DIAGNOSIS — Z20.822: ICD-10-CM

## 2023-03-09 DIAGNOSIS — I21.3: Primary | ICD-10-CM

## 2023-03-09 LAB
ALBUMIN SERPL-MCNC: 2.5 G/DL (ref 3.4–5)
ALP SERPL-CCNC: 194 U/L (ref 45–117)
ALT SERPL-CCNC: 79 U/L (ref 13–61)
ANION GAP SERPL CALC-SCNC: 8 MMOL/L (ref 8–16)
APTT BLD: 31.8 SECONDS (ref 25.2–36.5)
AST SERPL-CCNC: 241 U/L (ref 15–37)
BASOPHILS # BLD: 0.3 % (ref 0–2)
BILIRUB SERPL-MCNC: 1.2 MG/DL (ref 0.2–1)
BUN SERPL-MCNC: 66.6 MG/DL (ref 7–18)
CALCIUM SERPL-MCNC: 8.1 MG/DL (ref 8.5–10.1)
CHLORIDE SERPL-SCNC: 96 MMOL/L (ref 98–107)
CO2 SERPL-SCNC: 26 MMOL/L (ref 21–32)
CREAT SERPL-MCNC: 7.8 MG/DL (ref 0.55–1.3)
DEPRECATED RDW RBC AUTO: 14.1 % (ref 11.9–15.9)
EOSINOPHIL # BLD: 1.6 % (ref 0–4.5)
GLUCOSE SERPL-MCNC: 128 MG/DL (ref 74–106)
HCT VFR BLD CALC: 35.9 % (ref 35.4–49)
HGB BLD-MCNC: 12.3 GM/DL (ref 11.7–16.9)
INR BLD: 1.06 (ref 0.83–1.09)
LIPASE SERPL-CCNC: 151 U/L (ref 73–393)
LYMPHOCYTES # BLD: 11 % (ref 8–40)
MAGNESIUM SERPL-MCNC: 2.2 MG/DL (ref 1.8–2.4)
MCH RBC QN AUTO: 32 PG (ref 25.7–33.7)
MCHC RBC AUTO-ENTMCNC: 34.3 G/DL (ref 32–35.9)
MCV RBC: 93.2 FL (ref 80–96)
MONOCYTES # BLD AUTO: 15.2 % (ref 3.8–10.2)
NEUTROPHILS # BLD: 71.9 % (ref 42.8–82.8)
PLATELET # BLD AUTO: 106 10^3/UL (ref 134–434)
PMV BLD: 10.5 FL (ref 7.5–11.1)
PROT SERPL-MCNC: 7.7 G/DL (ref 6.4–8.2)
PT PNL PPP: 12.3 SEC (ref 9.7–13)
RBC # BLD AUTO: 3.85 M/MM3 (ref 4–5.6)
SODIUM SERPL-SCNC: 131 MMOL/L (ref 136–145)
WBC # BLD AUTO: 10.6 K/MM3 (ref 4–10)
